# Patient Record
Sex: MALE | Race: WHITE | NOT HISPANIC OR LATINO | Employment: OTHER | URBAN - METROPOLITAN AREA
[De-identification: names, ages, dates, MRNs, and addresses within clinical notes are randomized per-mention and may not be internally consistent; named-entity substitution may affect disease eponyms.]

---

## 2017-08-29 ENCOUNTER — SLEEP CENTER VISIT (OUTPATIENT)
Dept: SLEEP MEDICINE | Facility: MEDICAL CENTER | Age: 57
End: 2017-08-29
Payer: COMMERCIAL

## 2017-08-29 VITALS
BODY MASS INDEX: 36.43 KG/M2 | RESPIRATION RATE: 16 BRPM | SYSTOLIC BLOOD PRESSURE: 122 MMHG | DIASTOLIC BLOOD PRESSURE: 70 MMHG | HEART RATE: 66 BPM | HEIGHT: 69 IN | WEIGHT: 246 LBS | OXYGEN SATURATION: 93 %

## 2017-08-29 DIAGNOSIS — J30.0 VASOMOTOR RHINITIS: ICD-10-CM

## 2017-08-29 DIAGNOSIS — G47.33 OSA ON CPAP: ICD-10-CM

## 2017-08-29 DIAGNOSIS — J34.2 DEVIATED NASAL SEPTUM: ICD-10-CM

## 2017-08-29 PROCEDURE — 99204 OFFICE O/P NEW MOD 45 MIN: CPT | Performed by: FAMILY MEDICINE

## 2017-08-29 RX ORDER — IBUPROFEN 800 MG/1
800 TABLET ORAL
COMMUNITY
Start: 2016-11-17

## 2017-08-29 RX ORDER — GABAPENTIN 400 MG/1
400 CAPSULE ORAL
COMMUNITY
Start: 2017-04-13

## 2017-08-29 RX ORDER — MORPHINE SULFATE 30 MG/1
30 TABLET, FILM COATED, EXTENDED RELEASE ORAL
COMMUNITY
Start: 2017-09-12 | End: 2017-10-12

## 2017-08-29 RX ORDER — ALBUTEROL SULFATE 90 UG/1
AEROSOL, METERED RESPIRATORY (INHALATION)
COMMUNITY
Start: 2017-07-12

## 2017-08-29 RX ORDER — ALFUZOSIN HYDROCHLORIDE 10 MG/1
10 TABLET, EXTENDED RELEASE ORAL
COMMUNITY
Start: 2017-04-13

## 2017-08-29 NOTE — PROGRESS NOTES
"     [facility]  Consult Note     Date: 8/29/2017 / Time: 2:21 PM    Patient ID:   Name:             Nilesh Huynh   YOB: 1960  Age:                 57 y.o.  male   MRN:               [id number]      Thank you for requesting a sleep medicine consultation on Nilesh Huynh at the sleep center. He presents today with the chief complaints of witnessed apnea and  occasional excessive daytime sleepiness (EDS) in spite with the use of CPAP. He is self reffered. He has hx of IRMA and was diagnosed about 10+ years the sleep study was done in [facility] and theresults are not available to be reviewed.. He felt the study wasn't done properly and her never felt any benefit of PAP therapy.     HISTORY OF PRESENT ILLNESS:       At night,  Nilesh Huynh goes to bed around 12-1 am on weekdays and around 12-1 am on weekends. He gets out of bed at 9-10 christina weekdays and at 9-10 am on weekends.  He  averages 6-7 hrs of sleep on a good night and 4 hrs on a bad night. Pt has bad nights 2-3 nights per week. He falls asleep within less than 5 minutes. He awakens  times a night due to 4-6 due to bathroom use.  It takes 1-5 min to fall back asleep.       Mr. Huynh has hx of  of snoring/witnessed apneas/gasping or choking in sleep.  He  denies any symptoms of restless legs syndrome such as an \"urge to move\"  He  legs in the evening or bedtime. He  denies any symptoms of narcolepsy such as sleep paralysis or cataplexy, or any symptoms to suggest parasomnias such as sleep walking or acting out of dreams. He  has not used any medications for her sleep problem.    Overall,  He doesnot finds his sleep refreshing. When He  wakes up in the morning, He  does does feel tired. In terms of  excessive daytime sleepiness,  He complains of sleepiness while  at work, while reading or watching TV and occasionally while driving.   He  Does not take regular naps.    REVIEW OF SYSTEMS:       Constitutional: Denies fevers, " Denies weight changes  Eyes: Denies changes in vision, no eye pain  Ears/Nose/Throat/Mouth: + nasal congestion  And ear fullness  Cardiovascular: Denies chest pain or palpitations   Respiratory: Denies shortness of breath , Denies cough  Gastrointestinal/Hepatic: Denies abdominal pain, nausea, vomiting, diarrhea, constipation or GI bleeding   Genitourinary: Denies bladder dysfunction, dysuria or frequency  Musculoskeletal/Rheum: Denies  joint pain and swelling   Skin/Breast: Denies rash, denies breast lumps or discharge  Neurological: Denies headache, confusion, memory loss or focal weakness/parasthesias  Psychiatric: denies mood disorder     Comprehensive review of systems form is reviewed with the patient and is attached in the EMR.     PMH:  has a past medical history of Back pain; Diabetes; Prostate disorder; and Sleep apnea.  MEDS:   Current Outpatient Prescriptions:   •  ibuprofen (MOTRIN) 800 MG Tab, Take 800 mg by mouth., Disp: , Rfl:   •  [START ON 9/12/2017] morphine ER (MS CONTIN) 30 MG Tab CR tablet, Take 30 mg by mouth., Disp: , Rfl:   •  gabapentin (NEURONTIN) 400 MG Cap, Take 400 mg by mouth., Disp: , Rfl:   •  alfuzosin (UROXATRAL) 10 MG SR tablet, Take 10 mg by mouth., Disp: , Rfl:   •  VENTOLIN  (90 Base) MCG/ACT Aero Soln inhalation aerosol, , Disp: , Rfl:   •  gabapentin (NEURONTIN) 400 MG CAPS, Take 400 mg by mouth 6 Times a Day. Taking 7 tablets per day., Disp: , Rfl:   •  alfuzosin (UROXATRAL) 10 MG SR tablet, Take 1 Tab by mouth every evening. (Patient not taking: Reported on 8/29/2017), Disp: , Rfl:   •  clonazepam (KLONOPIN) 1 MG TABS, Take 1 Tab by mouth 3 times a day. (Patient not taking: Reported on 8/29/2017), Disp: 60 Tab, Rfl:   •  methocarbamol (ROBAXIN) 750 MG TABS, Take 1 Tab by mouth 3 times a day. (Patient not taking: Reported on 8/29/2017), Disp: 120 Tab, Rfl:   •  oxycodone CR (OXYCONTIN) 10 MG TB12, Take 1 Tab by mouth 3 times a day. (Patient not taking: Reported on  "8/29/2017), Disp: 60 Tab, Rfl:   •  Oxycodone-Acetaminophen (PERCOCET-10)  MG TABS, Take 1-2 Tabs by mouth every four hours as needed for Moderate Pain. (Patient not taking: Reported on 8/29/2017), Disp: 15 Tab, Rfl:   •  senna-docusate (PERICOLACE OR SENOKOT S) 8.6-50 MG TABS, Take 2 Tabs by mouth every day. (Patient not taking: Reported on 8/29/2017), Disp: 30 Tab, Rfl:   ALLERGIES: No Known Allergies  SURGHX:   Past Surgical History:   Procedure Laterality Date   • LAMINOTOMY  6/3/2013    Performed by Jamar Sabillon M.D. at SURGERY [facility]   • LUMBAR LAMINECTOMY DISKECTOMY      2001     SOCHX:  reports that he has never smoked. He has never used smokeless tobacco. He reports that he does not use drugs.. Pt use to work as resturant manager..   FH:   Family History   Problem Relation Age of Onset   • No Known Problems Mother    • No Known Problems Father    • Sleep Apnea Neg Hx          Physical Exam:  Vitals/ General Appearance:   Weight/BMI: Body mass index is 36.33 kg/m².  Blood pressure 122/70, pulse 66, resp. rate 16, height 1.753 m (5' 9\"), weight 111.6 kg (246 lb), SpO2 93 %.  Vitals:    08/29/17 1408   BP: 122/70   Pulse: 66   Resp: 16   SpO2: 93%   Weight: 111.6 kg (246 lb)   Height: 1.753 m (5' 9\")       Pt. is alert and oriented to time, place and person. Cooperative and in no apparent distress.       1. Head: Atraumatic, normocephalic. There is no mandibular hypoplasia or maxillary over-jut.   2. Ears: Normal tympanic membrane and no discharge  3. Nose:BLT inferior turbinate hypertophy, left septal deviation  4. Throat: Oropharynx appears crowded  in that the palate is overhanging ([place] scale 4. Tonsils are not enlarged, long uvula , pharynx not inflamed. Tongue is large for the mouth.   5. Neck: Supple. . No thyromegaly  6. Chest: Trachea central, no spine deformity (Scoliosis/Lordosis/Kyphosis)  7. Lungs auscultation: B/L good air entry, vesicular breath sounds, no adventitious " sounds  8. Heart auscultation: 1st and 2nd heart sounds normal, regular rhythm. No appreciable murmur.  9. Abdomen: Soft, non tender, no organomegaly. Bowel sounds present  10. Extremities: No, no deformity, no clubbing, no pedal edema.   Peripheral pulses felt.  11. Skin: No rash  12. NEUROLOGICAL EXAMINATION: On neurological exam, the patient was alert and oriented x3. speech was clear and fluent without dysarthria.  Cranial nerve exam II through XII was normal. Motor exam revealed normal bulk, tone and strength 5/5, which was symmetrical in the upper and lower extremities bilaterally.     INVESTIGATIONS:   Previous sleep studies not available to be reviewed.     ASSESSMENT AND PLAN     1. IRMA on CPAP  POLYSOMNOGRAPHY, 4 OR MORE   2. Vasomotor rhinitis  REFERRAL TO ENT   3. Deviated nasal septum  REFERRAL TO ENT       1. Obstructive Sleep Apnea (IRMA). He  has excessive daytime sleepiness that interferes with activites of daily living. He  risk factors for IRMA include obesity, thick neck, and crowded oropharynx.  He is currently on CPAP for 10+ years however continues to have symptoms of IRMA.     The pathophysiology of IRMA and the increased risk of cardiovascular morbidity from untreated IRMA is discussed in detail with the patient. He  also has HTN, DM which can be worsened by her IRMA.     We have discussed diagnostic options including in-laboratory, attended polysomnography and home sleep testing. We have also discussed treatment options including airway pressurization, reconstructive otolaryngologic surgery, dental appliances and weight management.     He  will be scheduled for an overnight Split night study. He is currently on significant amount of opiate medication which puts him at risk for central sleep apnea therefore I recommend in lab sleep study.      Subsequently,treatment options will be discussed based on the diagnostic study. Meanwhile, He is urged to continues his current CPAP machine , avoid supine  sleep, weight gain and alcoholic beverages since all of these can worsen IRMA. He is cautioned against drowsy driving. If He feels sleepy while driving, He must pull over for a break/nap, rather than persist on the road, in the interest of He own safety and that of others on the road.    2. Vasomotor Rhinitis: Rhinorrhea and nasal congestion all year long as long as he remember. There is no obvious seasonal component to his nasal congestion. Recommended daily saline nasal rinses. I also recommended PRN use of zyrtec/cleritin and Nasacort (mometasone) nasal spray, one or two sniffs in each side of nose daily with worsening of the symptoms. I am referring him to ENT since he has significant nasal deviation which is hindering in PAP therapy.       3. Regarding treatment of other past medical problems and general health maintenance,  He is urged to follow up with PCP.    Return to clinic after Split study.

## 2017-08-29 NOTE — PATIENT INSTRUCTIONS
Sleep Apnea   Sleep apnea is a sleep disorder characterized by abnormal pauses in breathing while you sleep. When your breathing pauses, the level of oxygen in your blood decreases. This causes you to move out of deep sleep and into light sleep. As a result, your quality of sleep is poor, and the system that carries your blood throughout your body (cardiovascular system) experiences stress. If sleep apnea remains untreated, the following conditions can develop:  · High blood pressure (hypertension).  · Coronary artery disease.  · Inability to achieve or maintain an erection (impotence).  · Impairment of your thought process (cognitive dysfunction).  There are three types of sleep apnea:  1. Obstructive sleep apnea--Pauses in breathing during sleep because of a blocked airway.  2. Central sleep apnea--Pauses in breathing during sleep because the area of the brain that controls your breathing does not send the correct signals to the muscles that control breathing.  3. Mixed sleep apnea--A combination of both obstructive and central sleep apnea.  RISK FACTORS  The following risk factors can increase your risk of developing sleep apnea:  · Being overweight.  · Smoking.  · Having narrow passages in your nose and throat.  · Being of older age.  · Being male.  · Alcohol use.  · Sedative and tranquilizer use.  · Ethnicity. Among individuals younger than 35 years,  Americans are at increased risk of sleep apnea.  SYMPTOMS   · Difficulty staying asleep.  · Daytime sleepiness and fatigue.  · Loss of energy.  · Irritability.  · Loud, heavy snoring.  · Morning headaches.  · Trouble concentrating.  · Forgetfulness.  · Decreased interest in sex.  · Unexplained sleepiness.  DIAGNOSIS   In order to diagnose sleep apnea, your caregiver will perform a physical examination. A sleep study done in the comfort of your own home may be appropriate if you are otherwise healthy. Your caregiver may also recommend that you spend the  "night in a sleep lab. In the sleep lab, several monitors record information about your heart, lungs, and brain while you sleep. Your leg and arm movements and blood oxygen level are also recorded.  TREATMENT  The following actions may help to resolve mild sleep apnea:  · Sleeping on your side.    · Using a decongestant if you have nasal congestion.    · Avoiding the use of depressants, including alcohol, sedatives, and narcotics.    · Losing weight and modifying your diet if you are overweight.  There also are devices and treatments to help open your airway:  · Oral appliances. These are custom-made mouthpieces that shift your lower jaw forward and slightly open your bite. This opens your airway.  · Devices that create positive airway pressure. This positive pressure \"splints\" your airway open to help you breathe better during sleep. The following devices create positive airway pressure:  ¨ Continuous positive airway pressure (CPAP) device. The CPAP device creates a continuous level of air pressure with an air pump. The air is delivered to your airway through a mask while you sleep. This continuous pressure keeps your airway open.  ¨ Nasal expiratory positive airway pressure (EPAP) device. The EPAP device creates positive air pressure as you exhale. The device consists of single-use valves, which are inserted into each nostril and held in place by adhesive. The valves create very little resistance when you inhale but create much more resistance when you exhale. That increased resistance creates the positive airway pressure. This positive pressure while you exhale keeps your airway open, making it easier to breath when you inhale again.  ¨ Bilevel positive airway pressure (BPAP) device. The BPAP device is used mainly in patients with central sleep apnea. This device is similar to the CPAP device because it also uses an air pump to deliver continuous air pressure through a mask. However, with the BPAP machine, the " pressure is set at two different levels. The pressure when you exhale is lower than the pressure when you inhale.  · Surgery. Typically, surgery is only done if you cannot comply with less invasive treatments or if the less invasive treatments do not improve your condition. Surgery involves removing excess tissue in your airway to create a wider passage way.     This information is not intended to replace advice given to you by your health care provider. Make sure you discuss any questions you have with your health care provider.     Document Released: 12/08/2003 Document Revised: 01/08/2016 Document Reviewed: 04/25/2013  [employer] Patient Education ©2016 [employer]    1. Nasonex 1-2 puff each nostril twice a day  2. Nasal saline rinses twice a day

## 2017-10-30 ENCOUNTER — TELEPHONE (OUTPATIENT)
Dept: PULMONOLOGY | Facility: HOSPICE | Age: 57
End: 2017-10-30

## 2017-10-31 ENCOUNTER — SLEEP STUDY (OUTPATIENT)
Dept: SLEEP MEDICINE | Facility: MEDICAL CENTER | Age: 57
End: 2017-10-31
Attending: FAMILY MEDICINE
Payer: COMMERCIAL

## 2017-10-31 DIAGNOSIS — G47.33 OSA ON CPAP: ICD-10-CM

## 2017-10-31 PROCEDURE — 95811 POLYSOM 6/>YRS CPAP 4/> PARM: CPT | Performed by: INTERNAL MEDICINE

## 2017-11-01 NOTE — TELEPHONE ENCOUNTER
Pt called to clarify sleep study instructions, answered questions advised to call back with any other questions/ap

## 2017-11-01 NOTE — PROCEDURES
Clinical Comments:    The patient underwent a split night polysomnogram with a CPAP titration using the standard montage for measurement of paramaters of sleep, respiratory events, movement abnormalities, heart rate and rhythm.  A Microphone was used to monitior snoring.    Analysis:  Study start time was 09:36:55 PM.  Total recording time was 3h 18.5m (198 minutes) with a total sleep time of 2h 44.5m (164 minutes) resulting in a sleep efficiency of 82.87%.  Sleep latency from the start fo the study was 03 minutes minutes and REM latency from sleep onset was 257 minutes minutes.    Respiratory:   There were 234 apneas in total consisting of 135 obstructive apneas, 4 mixed apneas, and 95 central apneas.  There were 27 hypopneas in total.  The apnea index was 85.35 per hour and the hypopnea index was 9.85 per hour.  The overall AHI was 95.2, with a REM AHI of 65.00, and a supine AHI of 95.20.    Limb Movements:  There were a total of 104 periodic leg movements, of which 52 were PLMS arousals.  This resulted in a PLMS index of 37.9 and a PLMS arousal index of 19.0    Oximetry:  The mean SaO2 was 83.0% for the diagnostic portion of the study, with a minimum SaO2 of 50.0%.        Treatment:  Interpretation:  Treatment recording time was 4h 48.0m (288 minutes) with a total sleep time of 4h 18.0m (258 minutes) resulting in a sleep efficiency of 89.6%.    Sleep latency from the start of treatment was 04 minutes minutes and REM latency from sleep onset was 1h 34.5m minutes.    The patient had 198 arousals in total for an arousal index of 46.0.    Respiratory:   There were 145 apneas in total consisting of  66 obstructive apneas, 72 central apneas, and 7 mixed apneas for an apnea index of 33.72.    The patient had 75 hypopneas in total, which resulted in a hypopnea index of 17.44.    The overall AHI was 51.16, with a REM AHI of 26.15, and a supine AHI of 51.16.       Limb Movements:  There were a total of 412 periodic leg  movements, of which 82 were PLMS arousals.  This resulted in a PLMS index of 95.8 and a PLMS arousal index of 19.1.    Oximetry:  The mean SaO2 during treatment was 82.0%, with a minimum oxygen saturation of 50.0%.        Interpretation:    This is a split-night study.    In the diagnostic phase there is fragmentation of sleep with increased wake after sleep onset time and an elevated arousal and awakening index.  Some REM sleep time is recorded.  Sleep onset latency is short, suggesting sleep deprivation.  There are frequent periodic limb movements and the periodic limb movement with arousal index is 22.4.  The apnea hypopnea index is 85.3 events per hour, including 95 episodes of central apnea, 35 episodes of obstructive apnea, and 27 episodes of hypopnea.  All the diagnostic study was done in the supine body position.  The lowest arterial oxygen saturation is 50% on room air and he spends about 88% of the diagnostic time with a saturation below 90%.  The heart rate varies from 57-91 bpm.    In the treatment phase of the study there is continued fragmentation of sleep with increased wake after sleep onset time but significant REM sleep time is recorded.  The periodic limb movements persist and are associated with fragmentation of sleep.  CPAP was adjusted across a pressure range of 8-10 cm water pressure with persistence of central and obstructive apnea episodes as well as a few hypopnea episodes as well.  BiPAP was initiated at 13-18/9-14 cm water with persistence of central apnea episodes and occasional hypopnea events and the respiratory events were associated with persistent severe hypoxemia.  Overall there were 72 episodes of central apnea, 6-6 episodes of obstructive apnea, and 75 episodes of hypopnea during the treatment phase.    Assessment:  Very severe sleep apnea hypopnea with an apnea hypopnea index of 95.2 events per hour and a significant number of central apnea episodes.  Severe nocturnal hypoxemia  with a lowest arterial oxygen saturation of 50% on room air.  Fragmentation of sleep related to the breathing events and the periodic limb movements as well.  Central apnea and hypopnea episodes persisted throughout the titration despite the application of CPAP and BiPAP therapy.    Recommendations:  Follow-up polysomnography dedicated to titration of BiPAP at higher levels and the use of ASV or iVAPS as required.  The periodic limb movements may improve with effective treatment for sleep disordered breathing but if they persist and are associated with daytime symptoms, further evaluation and treatment may be needed.

## 2017-11-06 ENCOUNTER — TELEPHONE (OUTPATIENT)
Dept: PULMONOLOGY | Facility: HOSPICE | Age: 57
End: 2017-11-06

## 2017-11-06 NOTE — TELEPHONE ENCOUNTER
Pt was informed to bring the CPAP machine to his visit on 11/7/17 due to him not knowing where to find the chip.

## 2017-11-07 ENCOUNTER — SLEEP CENTER VISIT (OUTPATIENT)
Dept: SLEEP MEDICINE | Facility: MEDICAL CENTER | Age: 57
End: 2017-11-07
Payer: COMMERCIAL

## 2017-11-07 VITALS
HEIGHT: 69 IN | OXYGEN SATURATION: 90 % | WEIGHT: 240 LBS | HEART RATE: 65 BPM | DIASTOLIC BLOOD PRESSURE: 56 MMHG | BODY MASS INDEX: 35.55 KG/M2 | SYSTOLIC BLOOD PRESSURE: 98 MMHG

## 2017-11-07 DIAGNOSIS — G47.33 OSA ON CPAP: ICD-10-CM

## 2017-11-07 DIAGNOSIS — G47.34 NOCTURNAL HYPOXEMIA: ICD-10-CM

## 2017-11-07 DIAGNOSIS — J30.0 VASOMOTOR RHINITIS, UNSPECIFIED CHRONICITY: ICD-10-CM

## 2017-11-07 DIAGNOSIS — G47.33 OSA (OBSTRUCTIVE SLEEP APNEA): ICD-10-CM

## 2017-11-07 PROCEDURE — 99214 OFFICE O/P EST MOD 30 MIN: CPT | Performed by: NURSE PRACTITIONER

## 2017-11-07 RX ORDER — MORPHINE SULFATE 30 MG/1
30 TABLET ORAL EVERY 4 HOURS PRN
COMMUNITY

## 2017-11-07 NOTE — PROGRESS NOTES
Chief Complaint   Patient presents with   • Apnea     Sleep Study Results       HPI:  Nilesh Huynh is a 57 y.o. year old male here today for follow-up on his split night sleep study. He was seen as a new patient with Dr. Mcintyre 8/29/2017. He has a history of sleep apnea diagnosed at an outside sleep facility over 10 years ago. He has been on CPAP therapy. Despite CPAP use he continues to experience non restorative sleep and daytime fatigue. He was ordered a repeat sleep study to qualify him for a new machine and re assess treatment needs. He is currently on a CPAP of 10 CM H20. His compliance download today in the office indicates an AHI of 66 with an average use of 3 hours at night.   PSG dated 10/31/2017 indicates an AHI of 95.2 with a low 02 of 50%. He had 36.4% centrals. He had an incomplete titration to CPAP and Bilevel pressures tried. On his final Bipap pressure of 18/14 CM H20 his AHI was reduced to 25 with a mean 02 saturation of only 85%.   He has a history of rhinitis. He was recommended an OTC antihistamine, saline irrigation and nasal steroid at his last office visit. He was also referred to ENT to evaluate for nasal septal deviation effecting his CPAP therapy. He has seen ENT in consult.   He denies significant dyspnea during the day. He has had 40 pound weight gain. He admits to not exercising routinely. He denies current cough, mucous or wheeze.     Past Medical History:   Diagnosis Date   • Back pain    • Diabetes    • Prostate disorder    • Sleep apnea        Past Surgical History:   Procedure Laterality Date   • LAMINOTOMY  6/3/2013    Performed by Jamar Sabillon M.D. at SURGERY [facility]   • LUMBAR LAMINECTOMY DISKECTOMY      2001       Family History   Problem Relation Age of Onset   • No Known Problems Mother    • No Known Problems Father    • Sleep Apnea Neg Hx        Social History     Social History   • Marital status: Single     Spouse name: N/A   • Number of children: N/A   •  Years of education: N/A     Occupational History   • Not on file.     Social History Main Topics   • Smoking status: Never Smoker   • Smokeless tobacco: Never Used   • Alcohol use Not on file   • Drug use: No   • Sexual activity: Not on file     Other Topics Concern   • Not on file     Social History Narrative   • No narrative on file         ROS:  Constitutional: Denies fevers, chills, sweats, weight loss  Eyes: Denies vision loss, pain, drainage, double vision. Wears glasses   Ears/Nose/Mouth/Throat: Denies ear ache, difficulty hearing, sore throat, persistent hoarseness, decayed teeth/toothache  Cardiovascular: Denies chest pain, tightness, palpitations, swelling in feet/legs, fainting, difficulty breathing when laying down  Respiratory: Denies shortness of breath, cough, sputum, wheezing, painful breathing, coughing up blood  GI: Denies heartburn, difficulty swallowing, nausea, vomiting, abdominal pain, diarrhea, constipation  : Denies frequent urination, painful urination  Integumentary: Denies rashes, lumps or color changes  MSK: Positive back pain.   Neurological: Denies frequent headaches, dizziness, weakness  Sleep: See HPI       Current Outpatient Prescriptions   Medication Sig Dispense Refill   • morphine (MS IR) 30 MG tablet Take 30 mg by mouth every four hours as needed for Severe Pain.     • ibuprofen (MOTRIN) 800 MG Tab Take 800 mg by mouth.     • gabapentin (NEURONTIN) 400 MG Cap Take 400 mg by mouth.     • alfuzosin (UROXATRAL) 10 MG SR tablet Take 10 mg by mouth.     • VENTOLIN  (90 Base) MCG/ACT Aero Soln inhalation aerosol      • Oxycodone-Acetaminophen (PERCOCET-10)  MG TABS Take 1-2 Tabs by mouth every four hours as needed for Moderate Pain. 15 Tab    • alfuzosin (UROXATRAL) 10 MG SR tablet Take 1 Tab by mouth every evening. (Patient not taking: Reported on 8/29/2017)     • clonazepam (KLONOPIN) 1 MG TABS Take 1 Tab by mouth 3 times a day. (Patient not taking: Reported on  "8/29/2017) 60 Tab    • methocarbamol (ROBAXIN) 750 MG TABS Take 1 Tab by mouth 3 times a day. (Patient not taking: Reported on 8/29/2017) 120 Tab    • oxycodone CR (OXYCONTIN) 10 MG TB12 Take 1 Tab by mouth 3 times a day. (Patient not taking: Reported on 8/29/2017) 60 Tab    • senna-docusate (PERICOLACE OR SENOKOT S) 8.6-50 MG TABS Take 2 Tabs by mouth every day. (Patient not taking: Reported on 8/29/2017) 30 Tab    • gabapentin (NEURONTIN) 400 MG CAPS Take 400 mg by mouth 6 Times a Day. Taking 7 tablets per day.       No current facility-administered medications for this visit.        No Known Allergies    Blood pressure (!) 98/56, pulse 65, height 1.753 m (5' 9\"), weight 108.9 kg (240 lb), SpO2 90 %.    PE:   Appearance: Well developed, well nourished, no acute distress  Eyes: PERRL, EOM intact, sclera white, conjunctiva moist  Ears: no lesions or deformities  Hearing: grossly intact  Nose: no lesions or deformities  Oropharynx: tongue normal, posterior pharynx without erythema or exudate  Mallampati Classification: class 3  Neck: supple, trachea midline, no masses   Respiratory effort: no intercostal retractions or use of accessory muscles  Lung auscultation: no rales, rhonchi or wheezes  Heart auscultation: no murmur rub or gallop  Extremities: no cyanosis or edema  Abdomen: soft ,non tender, no masses  Gait and Station: normal  Digits and nails: no clubbing, cyanosis, petechiae or nodes.  Cranial nerves: grossly intact  Skin: no rashes, lesions or ulcers noted  Orientation: Oriented to time, person and place  Mood and affect: mood and affect appropriate, normal interaction with examiner  Judgement: Intact          Assessment:  1. IRMA on CPAP     2. Nocturnal hypoxemia  POLYSOMNOGRAPHY TITRATION    ECHOCARDIOGRAM COMP W/O CONT   3. BMI 35.0-35.9,adult     4. Vasomotor rhinitis, unspecified chronicity     5. IRMA (obstructive sleep apnea)  POLYSOMNOGRAPHY TITRATION         Plan:    1) Reviewed his split night " sleep study in detail with Dr. Mcintyre. He has severe sleep apnea with significant hypoxemia. He did have 36.4% centrals. He had an incomplete titration to CPAP and Bilevel pressures tried. Dr. Mcintyre recommends an in lab dedicated Bipap titration study now with possible switch to ASV if qualifies. We will arrange for dedicated titration study with prompt follow up in the office after for results. He will remain on CPAP in the interim.   2) Sleep hygiene discussed.  3) Continue saline sinus rinse, nasal steroid and OTC antihistamine. Request consult records from ENT.   4) Echocardiogram now for further work up his significant nocturnal hypoxemia.  5) Follow up after Echo and titration study with Dr. Mcintyre or myself.

## 2017-11-07 NOTE — PATIENT INSTRUCTIONS
Plan:    1) Reviewed his split night sleep study in detail with Dr. Mcintyre. He has severe sleep apnea with significant hypoxemia. He did have 36.4% centrals. He had an incomplete titration to CPAP and Bilevel pressures tried. Dr. Mcintyre recommends an in lab dedicated Bipap titration study now with possible switch to ASV if qualifies. We will arrange for dedicated titration study with prompt follow up in the office after for results. He will remain on CPAP in the interim.   2) Sleep hygiene discussed.  3) Continue saline sinus rinse, nasal steroid and OTC antihistamine. Request consult records from ENT.   4) Echocardiogram now for further work up his significant nocturnal hypoxemia.  5) Follow up after Echo and titration study with Dr. Mcintyre or myself.

## 2017-11-21 PROCEDURE — 93306 TTE W/DOPPLER COMPLETE: CPT | Mod: 26 | Performed by: INTERNAL MEDICINE

## 2017-11-21 PROCEDURE — 93306 TTE W/DOPPLER COMPLETE: CPT

## 2017-11-22 ENCOUNTER — HOSPITAL ENCOUNTER (OUTPATIENT)
Dept: CARDIOLOGY | Facility: MEDICAL CENTER | Age: 57
End: 2017-11-22
Attending: NURSE PRACTITIONER
Payer: COMMERCIAL

## 2017-11-22 DIAGNOSIS — G47.34 NOCTURNAL HYPOXEMIA: ICD-10-CM

## 2017-11-22 LAB
LV EJECT FRACT  99904: 65
LV EJECT FRACT MOD 2C 99903: 63.59
LV EJECT FRACT MOD 4C 99902: 70.11
LV EJECT FRACT MOD BP 99901: 67.69

## 2018-01-13 ENCOUNTER — SLEEP STUDY (OUTPATIENT)
Dept: SLEEP MEDICINE | Facility: MEDICAL CENTER | Age: 58
End: 2018-01-13
Attending: NURSE PRACTITIONER
Payer: COMMERCIAL

## 2018-01-13 DIAGNOSIS — G47.34 NOCTURNAL HYPOXEMIA: ICD-10-CM

## 2018-01-13 DIAGNOSIS — G47.33 OSA (OBSTRUCTIVE SLEEP APNEA): ICD-10-CM

## 2018-01-13 PROCEDURE — 95811 POLYSOM 6/>YRS CPAP 4/> PARM: CPT | Performed by: INTERNAL MEDICINE

## 2018-01-15 NOTE — PROCEDURES
Clinical Comments:  The patient underwent a comprehensive polysomnogram using the standard montage for measurement of parameters of sleep, respiratory events, movement abnormalities, heart rate and rhythm. A microphone was used to monitor snoring.      ANALYSIS:  The total recording time was 458.7 minutes with a sleep period of 457.5 minutes and the total sleep time was 353.5 minutes with a sleep efficiency of 77.1%.  The sleep latency was 1.1 minutes, and REM latency was 202.5 minutes.  The patient experienced 134 arousals in total, for an arousal index of 22.7    RESPIRATORY: The patient had 137 apneas in total.  Of these, 6 were obstructive apneas, and 131 were central apneas.  This resulted in an apnea index (AI) of 23.3.  The patient had 259 hypopneas, for a hypopnea index of 44.0.  The overall AHI was 67.2, while the AHI during Stage R sleep was 48.8.  AHI while supine was 67.2.    OXIMETRY: Oxygen saturation monitoring showed a mean SpO2 of 86.2%, with a minimum oxygen saturation of 58.0%.  Oxygen saturations were less than or = 89% for 221.0 minutes of sleep time.    CARDIAC: The highest heart rate during the recording was 129.0 beats per minute.  The average heart rate during sleep was 74.6 bpm.    LIMB MOVEMENTS: There were a total of 0 PLMs during sleep, of which 0 were PLMs arousals.  This resulted in a PLMS index of 0.0.      Interpretation:    Mr. Huynh presented with excessive daytime somnolence. Split night polysomnography on August 31, 2017 demonstrated an apnea hypopnea index of 85.2 events per hour with the lowest arterial oxygen saturation of 50% on room air. 31% of the sleep breathing events were central apneas. There was no effective response to CPAP therapy and only a partial response to BiPAP therapy. He has been treated with BiPAP but he remains sleepy during the day with nocturnal hypoxemia. This study was designed to establish effective therapy.    There is fragmentation of sleep  with increased wake after sleep onset time totaling more than 1.5 hours and an elevated arousal and awakening index. No periodic limb movements are identified. 118 minutes of REM sleep time was included. Sleep onset latency is short at 1.1 minutes, suggesting sleep deprivation.    BiPAP therapy was applied with a pressure range of 16-19/12-15 cm water. In the final pressure stage, the hypopnea episodes were suppressed but central apnea episodes persisted in large numbers. Through the titration there were 131 episodes of central apnea, 6 obstructive apneas and 259 episodes of hypopnea. Servo adaptive BiPAP ventilation was then applied with an expiratory pressure range of 4-15 cm water with pressure support at zero to 20 cm water. Both preset and automatic respiratory rates were tried. The ASV seemed effective in reducing the number of central apnea episodes but hypopnea episodes persisted through the ASV titration. In the final pressure stage, the apnea hypopnea index was still 51.1 events per hour with a mean arterial oxygen saturation of 82% and a minimum saturation of 62% on room air. That stage in the titration included 53 minutes of REM sleep time, 52 minutes of non-REM sleep time, and was done in the supine body position.    Assessment:  This study demonstrates severe sleep apnea hypopnea with frequent central apnea and hypopnea events suggesting complex sleep apnea. The hypopnea episodes were largely suppressed at an expiratory pressure of 15 cm water but frequent central apnea episodes persisted on BiPAP. The ASV titration was characterized by suppression of the central apnea events but return of the hypopnea events even with expiratory pressures as high as 15 cm water. Sleep was better consolidated on the ASV treatment but severe hypoxemia persisted. This was an unsuccessful titration.    Recommendations:  A specific treatment plan is difficult to extrapolate from this study. Unfortunately the best  alternative would probably be follow-up polysomnography with further titration of ASV therapy. Higher minimum expiratory pressures might suppress the persisting hypopnea events and I would recommend the use of the Respironics ASV protocol which has the capacity to adjust both inspiratory and expiratory pressures. He did best with a large air fit F 20 full face mask.

## 2018-01-25 ENCOUNTER — TELEPHONE (OUTPATIENT)
Dept: PULMONOLOGY | Facility: HOSPICE | Age: 58
End: 2018-01-25

## 2018-01-30 ENCOUNTER — SLEEP CENTER VISIT (OUTPATIENT)
Dept: SLEEP MEDICINE | Facility: MEDICAL CENTER | Age: 58
End: 2018-01-30
Payer: COMMERCIAL

## 2018-01-30 VITALS
OXYGEN SATURATION: 92 % | RESPIRATION RATE: 16 BRPM | HEIGHT: 69 IN | WEIGHT: 258.3 LBS | BODY MASS INDEX: 38.26 KG/M2 | HEART RATE: 81 BPM | DIASTOLIC BLOOD PRESSURE: 62 MMHG | SYSTOLIC BLOOD PRESSURE: 108 MMHG

## 2018-01-30 DIAGNOSIS — G47.33 OSA (OBSTRUCTIVE SLEEP APNEA): ICD-10-CM

## 2018-01-30 DIAGNOSIS — G47.34 NOCTURNAL HYPOXEMIA: ICD-10-CM

## 2018-01-30 DIAGNOSIS — G47.31 CENTRAL SLEEP APNEA: ICD-10-CM

## 2018-01-30 DIAGNOSIS — I27.20 PULMONARY HYPERTENSION (HCC): ICD-10-CM

## 2018-01-30 DIAGNOSIS — J30.0 VASOMOTOR RHINITIS, UNSPECIFIED CHRONICITY: ICD-10-CM

## 2018-01-30 PROCEDURE — 99213 OFFICE O/P EST LOW 20 MIN: CPT | Performed by: NURSE PRACTITIONER

## 2018-01-30 NOTE — PROGRESS NOTES
Chief Complaint   Patient presents with   • Apnea     Sleep Study Results       HPI:  Nilesh Hyunh is a 57 y.o. year old male here today for follow-up on his complex sleep apnea and recent titration study. He was seen as a new patient with Dr. Mcintyre 8/29/2017. He has a history of sleep apnea diagnosed at an outside sleep facility over 10 years ago. He has been on CPAP therapy. Despite CPAP use he continues to experience non restorative sleep and daytime fatigue. He was ordered a repeat sleep study to qualify him for a new machine and re assess treatment needs. He is currently on a CPAP of 10 CM H20. Compliance download at his follow up visit indicated an AHI of 66 with an average use of 3 hours at night.   PSG dated 10/31/2017 indicates an AHI of 95.2 with a low 02 of 50%. He had 36.4% centrals. He had an incomplete titration to CPAP and Bilevel pressures tried. On his final Bipap pressure of 18/14 CM H20 his AHI was reduced to 25 with a mean 02 saturation of only 85%. He underwent a dedicated Bipap titration study 1/13/2017 which indicated an incomplete titration to all Bipap pressures tried. He was switched to ASV with an ongoing elevated AHI. He had 33.1% centrals during his titration. He states he was unable to sleep the night of the study and does not feel it was a good study. He states the night of the study the room was too cold and he was unable to sleep.     He has a history of rhinitis. He was recommended an OTC antihistamine, saline irrigation and nasal steroid at his last office visit. He was also referred to ENT to evaluate for nasal septal deviation effecting his CPAP therapy. He has seen ENT in consult.   He denies significant dyspnea during the day. He has had 40 pound weight gain. He admits to not exercising routinely. He denies current cough, mucous or wheeze.     Echo was obtained given his significant 02 desaturations 11/21/2017 and indicates;  CONCLUSIONS  No prior study is available for  comparison.   Technically difficult study - adequate information is obtained.   Left ventricular ejection fraction is visually estimated to be 65%.  No significant valve disease or flow abnormalities.   Right ventricle not well visualized.  Estimated right ventricular   systolic pressure is at least 35 mmHg.      Past Medical History:   Diagnosis Date   • Back pain    • Diabetes    • Prostate disorder    • Sleep apnea        Past Surgical History:   Procedure Laterality Date   • LAMINOTOMY  6/3/2013    Performed by Jamar Sabillon M.D. at SURGERY [facility]   • LUMBAR LAMINECTOMY DISKECTOMY      2001       Family History   Problem Relation Age of Onset   • No Known Problems Mother    • No Known Problems Father    • Sleep Apnea Neg Hx        Social History     Social History   • Marital status: Single     Spouse name: N/A   • Number of children: N/A   • Years of education: N/A     Occupational History   • Not on file.     Social History Main Topics   • Smoking status: Never Smoker   • Smokeless tobacco: Never Used   • Alcohol use 0.6 - 1.2 oz/week     1 - 2 Glasses of wine per week   • Drug use: No   • Sexual activity: Not on file     Other Topics Concern   • Not on file     Social History Narrative   • No narrative on file         ROS:  Constitutional: Denies fevers, chills, sweats, weight loss  Eyes: Denies vision loss, pain, drainage, double vision. Wears glasses   Ears/Nose/Mouth/Throat: Denies ear ache, difficulty hearing, sore throat, persistent hoarseness, decayed teeth/toothache  Cardiovascular: Denies chest pain, tightness, palpitations, swelling in feet/legs, fainting, difficulty breathing when laying down  Respiratory: See HPI   GI: Denies heartburn, difficulty swallowing, nausea, vomiting, abdominal pain, diarrhea, constipation  : Denies frequent urination, painful urination  Integumentary: Denies rashes, lumps or color changes  MSK: Denies painful joints, sore muscles, and back pain.  "  Neurological: Denies frequent headaches, dizziness, weakness  Sleep: See HPI     Current Outpatient Prescriptions   Medication Sig Dispense Refill   • morphine (MS IR) 30 MG tablet Take 30 mg by mouth every four hours as needed for Severe Pain.     • ibuprofen (MOTRIN) 800 MG Tab Take 800 mg by mouth.     • VENTOLIN  (90 Base) MCG/ACT Aero Soln inhalation aerosol      • Oxycodone-Acetaminophen (PERCOCET-10)  MG TABS Take 1-2 Tabs by mouth every four hours as needed for Moderate Pain. 15 Tab    • gabapentin (NEURONTIN) 400 MG CAPS Take 400 mg by mouth 6 Times a Day. Taking 7 tablets per day.     • gabapentin (NEURONTIN) 400 MG Cap Take 400 mg by mouth.     • alfuzosin (UROXATRAL) 10 MG SR tablet Take 10 mg by mouth.     • alfuzosin (UROXATRAL) 10 MG SR tablet Take 1 Tab by mouth every evening. (Patient not taking: Reported on 8/29/2017)     • clonazepam (KLONOPIN) 1 MG TABS Take 1 Tab by mouth 3 times a day. (Patient not taking: Reported on 8/29/2017) 60 Tab    • methocarbamol (ROBAXIN) 750 MG TABS Take 1 Tab by mouth 3 times a day. (Patient not taking: Reported on 8/29/2017) 120 Tab    • oxycodone CR (OXYCONTIN) 10 MG TB12 Take 1 Tab by mouth 3 times a day. (Patient not taking: Reported on 8/29/2017) 60 Tab    • senna-docusate (PERICOLACE OR SENOKOT S) 8.6-50 MG TABS Take 2 Tabs by mouth every day. (Patient not taking: Reported on 8/29/2017) 30 Tab      No current facility-administered medications for this visit.        No Known Allergies    Blood pressure 108/62, pulse 81, resp. rate 16, height 1.753 m (5' 9\"), weight 117.2 kg (258 lb 4.8 oz), SpO2 92 %.    PE:   Appearance: Obese, no acute distress  Eyes: PERRL, EOM intact, sclera white, conjunctiva moist  Ears: no lesions or deformities  Hearing: grossly intact  Nose: no lesions or deformities  Oropharynx: tongue normal, posterior pharynx without erythema or exudate  Mallampati Classification: Class 3  Neck: supple, trachea midline, no masses "   Respiratory effort: no intercostal retractions or use of accessory muscles  Lung auscultation: no rales, rhonchi or wheezes  Heart auscultation: no murmur rub or gallop  Extremities: no cyanosis or edema  Abdomen: soft ,non tender, no masses  Gait and Station: normal  Digits and nails: no clubbing, cyanosis, petechiae or nodes.  Cranial nerves: grossly intact  Skin: no rashes, lesions or ulcers noted  Orientation: Oriented to time, person and place  Mood and affect: mood and affect appropriate, normal interaction with examiner  Judgement: Intact          Assessment:  1. IRMA (obstructive sleep apnea)  POLYSOMNOGRAPHY TITRATION   2. Central sleep apnea  POLYSOMNOGRAPHY TITRATION   3. Nocturnal hypoxemia     4. Vasomotor rhinitis, unspecified chronicity     5. BMI 38.0-38.9,adult     6. Pulmonary hypertension           Plan:    1) Reviewed titration study in detail. He had an incomplete titration to Bipap and ASV. He only had 33% centrals during the titration. He did not feel he slept well the night of the study. Dr. Medley recommends a repeat titration study. If he qualifies for servo he recommends Auto SV.   2) Sleep hygiene discussed.  3) Continue saline sinus rinse, nasal steroid and OTC antihistamine. Request consult records from ENT.   4) Echocardiogram indicates Pulmonary Hypertension.   5) Follow up after titration study with Dr. Mcintyre or myself.

## 2018-01-30 NOTE — PATIENT INSTRUCTIONS
Plan:    1) Reviewed titration study in detail. He had an incomplete titration to Bipap and ASV. He only had 33% centrals during the titration. He did not feel he slept well the night of the study. Dr. Medley recommends a repeat titration study. If he qualifies for servo he recommends Auto SV.   2) Sleep hygiene discussed.  3) Continue saline sinus rinse, nasal steroid and OTC antihistamine. Request consult records from ENT.   4) Echocardiogram indicates Pulmonary Hypertension.   5) Follow up after titration study with Dr. Mcintyre or myself.

## 2018-02-07 ENCOUNTER — SLEEP STUDY (OUTPATIENT)
Dept: SLEEP MEDICINE | Facility: MEDICAL CENTER | Age: 58
End: 2018-02-07
Attending: NURSE PRACTITIONER
Payer: COMMERCIAL

## 2018-02-07 DIAGNOSIS — G47.33 OSA (OBSTRUCTIVE SLEEP APNEA): ICD-10-CM

## 2018-02-07 DIAGNOSIS — G47.31 CENTRAL SLEEP APNEA: ICD-10-CM

## 2018-02-07 PROCEDURE — 95811 POLYSOM 6/>YRS CPAP 4/> PARM: CPT | Performed by: FAMILY MEDICINE

## 2018-02-08 NOTE — PROCEDURES
Clinical Comments:  The patient underwent a BIPAP titration using the standard montage for measurement of parameters of sleep, respiratory events, movement abnormalities, heart rate and rhythm. A microphone was used to monitor snoring.        INTERPRETATION:  Testing began at 8:27:21 PM.  The total recording time was 533.6 minutes with a sleep period of 530.3 minutes and the total sleep time was 439.0 minutes with a sleep efficiency of 82.3%.  The sleep latency was 3.2 minutes, and REM latency was 52.0 minutes.  The patient experienced 99 arousals in total, for an arousal index of 13.5     RESPIRATORY: The patient had 132 apneas in total.  Of these, 23 were obstructive apneas, and 109 were central apneas.  This resulted in an apnea index (AI) of 18.0.  The patient had 177 hypopneas, for a hypopnea index of 24.2.  The overall AHI was 42.2, while the AHI during Stage R sleep was 34.6.  AHI while supine was 42.2.     OXIMETRY: Oxygen saturation monitoring showed a mean SpO2 of 90.2%, with a minimum oxygen saturation of 0.0%.  Oxygen saturations were less than or = 89% for 98.4 minutes of sleep time.     CARDIAC: The highest heart rate during the recording was 82.0 beats per minute.  The average heart rate during sleep was 60.0 bpm.     LIMB MOVEMENTS: There were a total of 31 PLMs during sleep, of which 1 were PLMs arousals.  This resulted in a PLMS index of 4.2.     BIPAP was tried from 18/14cm H2O and titrated to 25/21cm H2O.  A backup rate was also tried at 12 and 13bpm.     Technical summary: The patient underwent a CPAP titration.  This was a 16 channel montage study to include a 6 channel EEG, a 2 channel EOG, and chin EMG, left and right leg EMG, a snore channel, and a CFLOW pressure transducer.   Respiratory effort was assessed with the use of a thoracic and abdominal monitor and overnight oximetry was obtained. Audio and video recordings were reviewed. This was a fully attended study and sleep stage scoring  was performed. The test was technically adequate.    General sleep summary:  During the overnight study, the sleep latency was 3.2 min which is decreased. The REM latency was 50 which is decreased. The total sleep time was 439 min and sleep efficiency was 82 which is decreased.  Sleep stage proportions showed no N3 sleep and increased WASO of 91 min.  In regards to sleep quality there was a mild degree of sleep fragmentation as shown by the arousal index of 13 an hour which is increased. The arousals were due to respiratory events.    CPAP Titration:  The PAP titration was initiated with  BiPAP 18/14 cm of water and the pressure which was slowly titrated up in an attempt to eliminate sleep disordered breathing and snoring. The final pressure tested during the study was BiPAP 25/21 cm water and at this final pressure the patient was observed in the supine position but not REM sleep stage. Due to central apneas, BiPAP ST mode was tried with back up rate of 12 and 13. The apnea hypopnea index was 28 per hour and O2 carmen 87%. The average O2 stauration was 92%. Snoring was resolved. There were no significant periodic limb movements.  The patient demonstrated NSR and an average heart rate of 59 beats per minute.  There was no ventricular ectopy or tachyarrhythmias. The patient utilized large simplus mask with heated humidification. The CPAP was well-tolerated and there were minimal air leaks. No supplemental oxygen was required.    Impression:  1.  Complex sleep apnea   2.  Sub optimal titration  3. Sleep hypoxia    Recommendations:  There is no definitive pressure that can be extrapolated from this study. Consider ASV titration in light of complex sleep apnea. The central sleep apnea could be due to prescribed opoid use. However BiPAP ST 25/21 cm with back up rate 12 BPM can be tried. Consider supplemental oxygen bleed in if sleep hypoxia persist on overnight pulse ox on the recommended pressure.  Recommended a data  card that can measure leak, apnea hypopnea index and compliance for further outpatient monitoring and management of CPAP therapy. In some cases alternative treatment options may prove effective in resolving sleep apnea and these options include upper airway surgery, the use of a dental orthotic or weight loss and positional therapy. Clinical correlation is required. In general patients with sleep apnea are advised to avoid alcohol and sedatives and to not operate a motor vehicle while drowsy and are at a greater risk for cardiovascular disease.

## 2018-02-20 ENCOUNTER — SLEEP CENTER VISIT (OUTPATIENT)
Dept: SLEEP MEDICINE | Facility: MEDICAL CENTER | Age: 58
End: 2018-02-20
Payer: COMMERCIAL

## 2018-02-20 VITALS
HEART RATE: 63 BPM | BODY MASS INDEX: 38.65 KG/M2 | RESPIRATION RATE: 16 BRPM | OXYGEN SATURATION: 93 % | DIASTOLIC BLOOD PRESSURE: 76 MMHG | HEIGHT: 70 IN | WEIGHT: 270 LBS | SYSTOLIC BLOOD PRESSURE: 128 MMHG

## 2018-02-20 DIAGNOSIS — G47.34 NOCTURNAL HYPOXEMIA: ICD-10-CM

## 2018-02-20 DIAGNOSIS — G47.31 CENTRAL SLEEP APNEA: ICD-10-CM

## 2018-02-20 DIAGNOSIS — J30.0 VASOMOTOR RHINITIS, UNSPECIFIED CHRONICITY: ICD-10-CM

## 2018-02-20 DIAGNOSIS — I27.20 PULMONARY HYPERTENSION (HCC): ICD-10-CM

## 2018-02-20 DIAGNOSIS — G47.33 OSA (OBSTRUCTIVE SLEEP APNEA): ICD-10-CM

## 2018-02-20 PROCEDURE — 99214 OFFICE O/P EST MOD 30 MIN: CPT | Performed by: NURSE PRACTITIONER

## 2018-02-20 NOTE — PROGRESS NOTES
Chief Complaint   Patient presents with   • Apnea     titration study results        HPI:  Nilesh Huynh is a 57 y.o. year old male here today for follow-up on his complex sleep apnea and recent titration study. He was seen as a new patient with Dr. Mcintyre 8/29/2017. He has a history of sleep apnea diagnosed at an outside sleep facility over 10 years ago. He has been on CPAP therapy. Despite CPAP use he continues to experience non restorative sleep and daytime fatigue. He was ordered a repeat sleep study to qualify him for a new machine and re assess treatment needs. He is currently on a CPAP of 10 CM H20. Compliance download at his follow up visit indicated an AHI of 66 with an average use of 3 hours at night.     PSG dated 10/31/2017 indicates an AHI of 95.2 with a low 02 of 50%. He had 36.4% centrals. He had an incomplete titration to CPAP and Bilevel pressures tried. On his final Bipap pressure of 18/14 CM H20 his AHI was reduced to 25 with a mean 02 saturation of only 85%. He underwent a dedicated Bipap titration study 1/13/2017 which indicated an incomplete titration to all Bipap pressures tried. He was switched to ASV with an ongoing elevated AHI. He only had 33.1% centrals during his titration. He states he was unable to sleep the night of the study and does not feel it was a good study. He states the night of the study the room was too cold and he was unable to sleep.   He had a repeat dedicated titration study 2/7/2018 which an incomplete titration to all Bilevel pressures tried. He only had 35.3% centrals during this titration and did not qualify for ASV. He did best on a Bilevel pressure of 25/20 CM H20 in which his AHI was reduced to 14.3. He had 32 minutes or REM sleep on this setting.     He has a history of rhinitis. He was recommended an OTC antihistamine, saline irrigation and nasal steroid at his last office visit. He was also referred to ENT to evaluate for nasal septal deviation effecting  his CPAP therapy. He has seen ENT in consult.     He denies significant dyspnea during the day. He has had 40 pound weight gain. He admits to not exercising routinely. He states back pain often limits his exercise. He denies current cough, mucous or wheeze.      Echo was obtained given his significant 02 desaturations 11/21/2017 and indicates;  CONCLUSIONS  No prior study is available for comparison.   Technically difficult study - adequate information is obtained.   Left ventricular ejection fraction is visually estimated to be 65%.  No significant valve disease or flow abnormalities.   Right ventricle not well visualized.  Estimated right ventricular   systolic pressure is at least 35 mmHg.         Past Medical History:   Diagnosis Date   • Back pain    • Diabetes    • Prostate disorder    • Sleep apnea        Past Surgical History:   Procedure Laterality Date   • LAMINOTOMY  6/3/2013    Performed by Jamar Sabillon M.D. at SURGERY [facility]   • LUMBAR LAMINECTOMY DISKECTOMY      2001       Family History   Problem Relation Age of Onset   • No Known Problems Mother    • No Known Problems Father    • Sleep Apnea Neg Hx        Social History     Social History   • Marital status: Single     Spouse name: N/A   • Number of children: N/A   • Years of education: N/A     Occupational History   • Not on file.     Social History Main Topics   • Smoking status: Never Smoker   • Smokeless tobacco: Never Used   • Alcohol use 0.6 - 1.2 oz/week     1 - 2 Glasses of wine per week   • Drug use: No   • Sexual activity: Not on file     Other Topics Concern   • Not on file     Social History Narrative   • No narrative on file       ROS:  Constitutional: Denies fevers, chills, sweats, weight loss  Eyes: Denies vision loss, pain, drainage, double vision. Wears glasses   Ears/Nose/Mouth/Throat: Denies ear ache, difficulty hearing, sore throat, persistent hoarseness, decayed teeth/toothache  Cardiovascular: Denies chest pain,  tightness, palpitations, swelling in feet/legs, fainting, difficulty breathing when laying down  Respiratory: Denies shortness of breath, cough, sputum, wheezing, painful breathing, coughing up blood  GI: Denies heartburn, difficulty swallowing, nausea, vomiting, abdominal pain, diarrhea, constipation  : Denies frequent urination, painful urination  Integumentary: Denies rashes, lumps or color changes  MSK: Positive for back pain   Neurological: Denies frequent headaches, dizziness, weakness  Sleep: See HPI       Current Outpatient Prescriptions   Medication Sig Dispense Refill   • morphine (MS IR) 30 MG tablet Take 30 mg by mouth every four hours as needed for Severe Pain.     • ibuprofen (MOTRIN) 800 MG Tab Take 800 mg by mouth.     • gabapentin (NEURONTIN) 400 MG Cap Take 400 mg by mouth.     • alfuzosin (UROXATRAL) 10 MG SR tablet Take 10 mg by mouth.     • VENTOLIN  (90 Base) MCG/ACT Aero Soln inhalation aerosol      • alfuzosin (UROXATRAL) 10 MG SR tablet Take 1 Tab by mouth every evening. (Patient not taking: Reported on 8/29/2017)     • clonazepam (KLONOPIN) 1 MG TABS Take 1 Tab by mouth 3 times a day. (Patient not taking: Reported on 8/29/2017) 60 Tab    • methocarbamol (ROBAXIN) 750 MG TABS Take 1 Tab by mouth 3 times a day. (Patient not taking: Reported on 8/29/2017) 120 Tab    • oxycodone CR (OXYCONTIN) 10 MG TB12 Take 1 Tab by mouth 3 times a day. (Patient not taking: Reported on 8/29/2017) 60 Tab    • Oxycodone-Acetaminophen (PERCOCET-10)  MG TABS Take 1-2 Tabs by mouth every four hours as needed for Moderate Pain. 15 Tab    • senna-docusate (PERICOLACE OR SENOKOT S) 8.6-50 MG TABS Take 2 Tabs by mouth every day. (Patient not taking: Reported on 8/29/2017) 30 Tab    • gabapentin (NEURONTIN) 400 MG CAPS Take 400 mg by mouth 6 Times a Day. Taking 7 tablets per day.       No current facility-administered medications for this visit.        No Known Allergies    Blood pressure 128/76, pulse  "63, resp. rate 16, height 1.778 m (5' 10\"), weight 122.5 kg (270 lb), SpO2 93 %.    PE:   Appearance: Well developed, well nourished, no acute distress  Eyes: PERRL, EOM intact, sclera white, conjunctiva moist  Ears: no lesions or deformities  Hearing: grossly intact  Nose: no lesions or deformities  Oropharynx: tongue normal, posterior pharynx without erythema or exudate  Mallampati Classification: Class 3  Neck: supple, trachea midline, no masses   Respiratory effort: no intercostal retractions or use of accessory muscles  Lung auscultation: no rales, rhonchi or wheezes  Heart auscultation: no murmur rub or gallop  Extremities: no cyanosis. Bilateral non pitting lower extremity edema   Abdomen: soft ,non tender, no masses  Gait and Station: normal  Digits and nails: no clubbing, cyanosis, petechiae or nodes.  Cranial nerves: grossly intact  Skin: no rashes, lesions or ulcers noted  Orientation: Oriented to time, person and place  Mood and affect: mood and affect appropriate, normal interaction with examiner  Judgement: Intact          Assessment:  1. IRMA (obstructive sleep apnea)  DME BIPAP    OVERNIGHT OXIMETRY   2. Central sleep apnea     3. Nocturnal hypoxemia     4. Vasomotor rhinitis, unspecified chronicity     5. BMI 38.0-38.9,adult     6. Pulmonary hypertension           Plan:    1) Reviewed titration study in detail. He continued to have an elevated AHI. However he does not qualify for ASV with only 35% centrals. He did relatively well on Bipap 25/20 with an AHI of 14.3. Order for Bipap 25/20 CM H20. CNOX on this setting prior to follow up visit to ensure adequate 02 saturations.   2) Sleep hygiene discussed in detail. Weight loss recommended.   3) Follow up in 2 months with CNOX and compliance card download, sooner if needed. Would recommend repeating Echo after he has been on treatment for 6 months for follow up on Pulmonary pressures.   "

## 2018-02-20 NOTE — PATIENT INSTRUCTIONS
Plan:    1) Reviewed titration study in detail. He continued to have an elevated AHI. However he does not qualify for ASV with only 35% centrals. He did relatively well on Bipap 25/20 with an AHI of 14.3. Order for Bipap 25/20 CM H20. CNOX on this setting prior to follow up visit to ensure adequate 02 saturations.   2) Sleep hygiene discussed in detail. Weight loss recommended.   3) Follow up in 2 months with CNOX and compliance card download, sooner if needed. Would recommend repeating Echo after he has been on treatment for 6 months for follow up on Pulmonary pressures.

## 2018-02-21 ENCOUNTER — TELEPHONE (OUTPATIENT)
Dept: SLEEP MEDICINE | Facility: MEDICAL CENTER | Age: 58
End: 2018-02-21

## 2018-02-21 NOTE — TELEPHONE ENCOUNTER
Patient is a [place] resident and [facility] ([place], [place]) is classified as OUT-OF-NETWORK for DME.  Checked the insurance website [employer] and [facility] is listed as a contracted provider.    Reprinted order and faxed to [facility] in [place], [place].  [place] is estimated at 35 miles and [place] is estimated at 41 miles from patient's home zip code.  Called [facility] in [place], spoke with Bhaskar.  He said that if the patient cannot be serviced in the [place] office, the order will be forwarded to the designated location based on the patient's home zip code.    [facility]  [facility] /  [phone] / fax [phone]

## 2018-07-17 ENCOUNTER — HOSPITAL ENCOUNTER (OUTPATIENT)
Dept: LAB | Facility: MEDICAL CENTER | Age: 58
End: 2018-07-17
Attending: FAMILY MEDICINE
Payer: COMMERCIAL

## 2018-07-17 LAB
ALBUMIN SERPL BCP-MCNC: 4.1 G/DL (ref 3.2–4.9)
ALBUMIN/GLOB SERPL: 1.5 G/DL
ALP SERPL-CCNC: 55 U/L (ref 30–99)
ALT SERPL-CCNC: 22 U/L (ref 2–50)
ANION GAP SERPL CALC-SCNC: 10 MMOL/L (ref 0–11.9)
AST SERPL-CCNC: 20 U/L (ref 12–45)
BILIRUB SERPL-MCNC: 0.3 MG/DL (ref 0.1–1.5)
BUN SERPL-MCNC: 18 MG/DL (ref 8–22)
CALCIUM SERPL-MCNC: 9.3 MG/DL (ref 8.5–10.5)
CHLORIDE SERPL-SCNC: 107 MMOL/L (ref 96–112)
CHOLEST SERPL-MCNC: 225 MG/DL (ref 100–199)
CO2 SERPL-SCNC: 25 MMOL/L (ref 20–33)
CREAT SERPL-MCNC: 0.89 MG/DL (ref 0.5–1.4)
EST. AVERAGE GLUCOSE BLD GHB EST-MCNC: 148 MG/DL
GLOBULIN SER CALC-MCNC: 2.8 G/DL (ref 1.9–3.5)
GLUCOSE SERPL-MCNC: 114 MG/DL (ref 65–99)
HBA1C MFR BLD: 6.8 % (ref 0–5.6)
HDLC SERPL-MCNC: 48 MG/DL
LDLC SERPL CALC-MCNC: 107 MG/DL
POTASSIUM SERPL-SCNC: 4.5 MMOL/L (ref 3.6–5.5)
PROT SERPL-MCNC: 6.9 G/DL (ref 6–8.2)
PSA SERPL-MCNC: 0.94 NG/ML (ref 0–4)
SODIUM SERPL-SCNC: 142 MMOL/L (ref 135–145)
TRIGL SERPL-MCNC: 348 MG/DL (ref 0–149)

## 2018-07-17 PROCEDURE — 80053 COMPREHEN METABOLIC PANEL: CPT

## 2018-07-17 PROCEDURE — 83036 HEMOGLOBIN GLYCOSYLATED A1C: CPT

## 2018-07-17 PROCEDURE — 80061 LIPID PANEL: CPT

## 2018-07-17 PROCEDURE — 84153 ASSAY OF PSA TOTAL: CPT

## 2018-07-17 PROCEDURE — 36415 COLL VENOUS BLD VENIPUNCTURE: CPT

## 2019-05-07 ENCOUNTER — HOSPITAL ENCOUNTER (EMERGENCY)
Dept: HOSPITAL 8 - ED | Age: 59
Discharge: HOME | End: 2019-05-07
Payer: COMMERCIAL

## 2019-05-07 VITALS — WEIGHT: 273.37 LBS | BODY MASS INDEX: 38.27 KG/M2 | HEIGHT: 71 IN

## 2019-05-07 VITALS — SYSTOLIC BLOOD PRESSURE: 137 MMHG | DIASTOLIC BLOOD PRESSURE: 92 MMHG

## 2019-05-07 DIAGNOSIS — N45.1: Primary | ICD-10-CM

## 2019-05-07 DIAGNOSIS — N43.3: ICD-10-CM

## 2019-05-07 DIAGNOSIS — N50.3: ICD-10-CM

## 2019-05-07 LAB
CULTURE INDICATED?: YES
MICROSCOPIC: (no result)

## 2019-05-07 PROCEDURE — 76870 US EXAM SCROTUM: CPT

## 2019-05-07 PROCEDURE — 87086 URINE CULTURE/COLONY COUNT: CPT

## 2019-05-07 PROCEDURE — 81001 URINALYSIS AUTO W/SCOPE: CPT

## 2019-05-07 PROCEDURE — 99284 EMERGENCY DEPT VISIT MOD MDM: CPT

## 2019-05-14 ENCOUNTER — HOSPITAL ENCOUNTER (OUTPATIENT)
Facility: MEDICAL CENTER | Age: 59
End: 2019-05-14
Attending: EMERGENCY MEDICINE
Payer: COMMERCIAL

## 2019-05-14 ENCOUNTER — OFFICE VISIT (OUTPATIENT)
Dept: URGENT CARE | Facility: PHYSICIAN GROUP | Age: 59
End: 2019-05-14
Payer: COMMERCIAL

## 2019-05-14 VITALS
DIASTOLIC BLOOD PRESSURE: 64 MMHG | HEART RATE: 78 BPM | BODY MASS INDEX: 39.65 KG/M2 | OXYGEN SATURATION: 97 % | WEIGHT: 277 LBS | TEMPERATURE: 98.7 F | RESPIRATION RATE: 16 BRPM | SYSTOLIC BLOOD PRESSURE: 122 MMHG | HEIGHT: 70 IN

## 2019-05-14 DIAGNOSIS — N50.89 SCROTAL SWELLING: ICD-10-CM

## 2019-05-14 DIAGNOSIS — Z87.438 HISTORY OF EPIDIDYMITIS: ICD-10-CM

## 2019-05-14 LAB
APPEARANCE UR: CLEAR
BILIRUB UR STRIP-MCNC: NORMAL MG/DL
COLOR UR AUTO: YELLOW
GLUCOSE UR STRIP.AUTO-MCNC: NORMAL MG/DL
KETONES UR STRIP.AUTO-MCNC: NORMAL MG/DL
LEUKOCYTE ESTERASE UR QL STRIP.AUTO: NORMAL
NITRITE UR QL STRIP.AUTO: NORMAL
PH UR STRIP.AUTO: 7.5 [PH] (ref 5–8)
PROT UR QL STRIP: NORMAL MG/DL
RBC UR QL AUTO: NORMAL
SP GR UR STRIP.AUTO: 1.02
UROBILINOGEN UR STRIP-MCNC: 0.2 MG/DL

## 2019-05-14 PROCEDURE — 81002 URINALYSIS NONAUTO W/O SCOPE: CPT | Performed by: EMERGENCY MEDICINE

## 2019-05-14 PROCEDURE — 81001 URINALYSIS AUTO W/SCOPE: CPT

## 2019-05-14 PROCEDURE — 99203 OFFICE O/P NEW LOW 30 MIN: CPT | Performed by: EMERGENCY MEDICINE

## 2019-05-14 ASSESSMENT — ENCOUNTER SYMPTOMS
ANOREXIA: 0
FEVER: 0
CHANGE IN BOWEL HABIT: 0
VOMITING: 0
ABDOMINAL PAIN: 0
SHORTNESS OF BREATH: 0
NAUSEA: 0
FLANK PAIN: 0
BACK PAIN: 1

## 2019-05-14 NOTE — PROGRESS NOTES
Subjective:      Nilesh Huynh is a 58 y.o. male who presents with Testicle Swelling (Seen at [facility] for swollen left testicle on 5/7/19.  They prescribed Ceftinir and was told to follow up.  Urology appointment in June.  )            Other   This is a new problem. Episode onset: over one week ago. The problem has been gradually improving. Pertinent negatives include no abdominal pain, anorexia, change in bowel habit, fever, nausea, rash, urinary symptoms or vomiting. Nothing aggravates the symptoms. Treatments tried: Ceftin. The treatment provided moderate relief.   Patient states noted scrotal swelling with urinary hesitancy; seen in ED, had ultrasound and urine testing done.  Advised that he had a cyst, infection; prescribed Ceftin.  Advised follow-up with urologist; appointment in about 3 weeks.  No trauma; no urinary retention.    Review of Systems   Constitutional: Negative for fever.   Respiratory: Negative for shortness of breath.    Cardiovascular: Positive for leg swelling.        Chronic, intermittent   Gastrointestinal: Negative for abdominal pain, anorexia, change in bowel habit, nausea and vomiting.   Genitourinary: Positive for urgency. Negative for dysuria, flank pain, frequency and hematuria.        No penile discharge, incontinence   Musculoskeletal: Positive for back pain.        Chronic, unchanged   Skin: Negative for rash.     PMH:  has a past medical history of Back pain; Diabetes; Prostate disorder; and Sleep apnea.  MEDS:   Current Outpatient Prescriptions:   •  morphine (MS IR) 30 MG tablet, Take 30 mg by mouth every four hours as needed for Severe Pain., Disp: , Rfl:   •  ibuprofen (MOTRIN) 800 MG Tab, Take 800 mg by mouth., Disp: , Rfl:   •  Oxycodone-Acetaminophen (PERCOCET-10)  MG TABS, Take 1-2 Tabs by mouth every four hours as needed for Moderate Pain., Disp: 15 Tab, Rfl:   •  gabapentin (NEURONTIN) 400 MG CAPS, Take 400 mg by mouth 6 Times a Day. Taking 7 tablets per  "day., Disp: , Rfl:   •  gabapentin (NEURONTIN) 400 MG Cap, Take 400 mg by mouth., Disp: , Rfl:   •  alfuzosin (UROXATRAL) 10 MG SR tablet, Take 10 mg by mouth., Disp: , Rfl:   •  VENTOLIN  (90 Base) MCG/ACT Aero Soln inhalation aerosol, , Disp: , Rfl:   •  alfuzosin (UROXATRAL) 10 MG SR tablet, Take 1 Tab by mouth every evening. (Patient not taking: Reported on 8/29/2017), Disp: , Rfl:   •  clonazepam (KLONOPIN) 1 MG TABS, Take 1 Tab by mouth 3 times a day. (Patient not taking: Reported on 8/29/2017), Disp: 60 Tab, Rfl:   •  methocarbamol (ROBAXIN) 750 MG TABS, Take 1 Tab by mouth 3 times a day. (Patient not taking: Reported on 8/29/2017), Disp: 120 Tab, Rfl:   •  oxycodone CR (OXYCONTIN) 10 MG TB12, Take 1 Tab by mouth 3 times a day. (Patient not taking: Reported on 5/14/2019), Disp: 60 Tab, Rfl:   •  senna-docusate (PERICOLACE OR SENOKOT S) 8.6-50 MG TABS, Take 2 Tabs by mouth every day. (Patient not taking: Reported on 8/29/2017), Disp: 30 Tab, Rfl:   ALLERGIES: No Known Allergies  SURGHX:   Past Surgical History:   Procedure Laterality Date   • LAMINOTOMY  6/3/2013    Performed by Jamar Sabillon M.D. at SURGERY [facility]S   • LUMBAR LAMINECTOMY DISKECTOMY      2001     SOCHX:  reports that he has never smoked. He has never used smokeless tobacco. He reports that he drinks about 0.6 - 1.2 oz of alcohol per week . He reports that he does not use drugs.  FH: family history includes No Known Problems in his father and mother.       Objective:     /64   Pulse 78   Temp 37.1 °C (98.7 °F) (Temporal)   Resp 16   Ht 1.778 m (5' 10\")   Wt (!) 125.6 kg (277 lb)   SpO2 97%   BMI 39.75 kg/m²      Physical Exam   Constitutional: He is oriented to person, place, and time. He appears well-developed and well-nourished. He is cooperative.  Non-toxic appearance. He does not have a sickly appearance. He does not appear ill. No distress.   Cardiovascular: Normal rate, regular rhythm and normal heart sounds.  "   1+ pedal edema   Pulmonary/Chest: Effort normal and breath sounds normal.   Abdominal: Soft. He exhibits no distension. There is no tenderness. There is no CVA tenderness. Hernia confirmed negative in the right inguinal area and confirmed negative in the left inguinal area.   Genitourinary: Right testis shows swelling. Right testis shows no tenderness. Cremasteric reflex is not absent on the right side. Left testis shows swelling. Left testis shows no tenderness. Cremasteric reflex is not absent on the left side. Circumcised. No penile erythema. No discharge found.   Lymphadenopathy: No inguinal adenopathy noted on the right or left side.   Neurological: He is alert and oriented to person, place, and time.   Skin: Skin is warm and dry.   Psychiatric: He has a normal mood and affect.          No urinary retention, symptoms appear to be improved.  No apparent immediate need for continued antibiotic therapy.     Assessment/Plan:     1. Scrotal swelling  Trace LE- POCT Urinalysis  UA reflex to culture sent  - [id number]; Future  REF UROLOGY  2. History of epididymitis

## 2019-05-15 DIAGNOSIS — Z87.438 HISTORY OF EPIDIDYMITIS: ICD-10-CM

## 2019-05-15 DIAGNOSIS — N50.89 SCROTAL SWELLING: ICD-10-CM

## 2019-05-16 LAB
APPEARANCE UR: CLEAR
BACTERIA #/AREA URNS HPF: NEGATIVE /HPF
COLOR UR: YELLOW
EPI CELLS #/AREA URNS HPF: NEGATIVE /HPF
GLUCOSE UR STRIP.AUTO-MCNC: NEGATIVE MG/DL
KETONES UR STRIP.AUTO-MCNC: NEGATIVE MG/DL
LEUKOCYTE ESTERASE UR QL STRIP.AUTO: NEGATIVE
MICRO URNS: ABNORMAL
NITRITE UR QL STRIP.AUTO: NEGATIVE
PH UR STRIP.AUTO: 8 [PH]
PROT UR QL STRIP: 30 MG/DL
RBC # URNS HPF: ABNORMAL /HPF
RBC UR QL AUTO: NEGATIVE
SP GR UR STRIP.AUTO: 1.01
WBC #/AREA URNS HPF: ABNORMAL /HPF

## 2019-09-29 ENCOUNTER — OFFICE VISIT (OUTPATIENT)
Dept: URGENT CARE | Facility: PHYSICIAN GROUP | Age: 59
End: 2019-09-29
Payer: COMMERCIAL

## 2019-09-29 VITALS
HEART RATE: 66 BPM | WEIGHT: 277 LBS | HEIGHT: 70 IN | RESPIRATION RATE: 18 BRPM | TEMPERATURE: 97.8 F | DIASTOLIC BLOOD PRESSURE: 78 MMHG | BODY MASS INDEX: 39.65 KG/M2 | SYSTOLIC BLOOD PRESSURE: 122 MMHG | OXYGEN SATURATION: 89 %

## 2019-09-29 DIAGNOSIS — J45.21 MILD INTERMITTENT ASTHMA WITH ACUTE EXACERBATION: Primary | ICD-10-CM

## 2019-09-29 DIAGNOSIS — R05.9 COUGH: ICD-10-CM

## 2019-09-29 DIAGNOSIS — R06.2 WHEEZE: ICD-10-CM

## 2019-09-29 PROCEDURE — 99214 OFFICE O/P EST MOD 30 MIN: CPT | Performed by: PHYSICIAN ASSISTANT

## 2019-09-29 RX ORDER — METHYLPREDNISOLONE 4 MG/1
TABLET ORAL
Qty: 1 KIT | Refills: 0 | Status: SHIPPED | OUTPATIENT
Start: 2019-09-29

## 2019-09-29 RX ORDER — IPRATROPIUM BROMIDE AND ALBUTEROL SULFATE 2.5; .5 MG/3ML; MG/3ML
3 SOLUTION RESPIRATORY (INHALATION) ONCE
Status: COMPLETED | OUTPATIENT
Start: 2019-09-29 | End: 2019-09-29

## 2019-09-29 RX ORDER — AZITHROMYCIN 250 MG/1
TABLET, FILM COATED ORAL
Qty: 6 TAB | Refills: 0 | Status: SHIPPED | OUTPATIENT
Start: 2019-09-29

## 2019-09-29 RX ORDER — AZITHROMYCIN 250 MG/1
TABLET, FILM COATED ORAL
Qty: 6 TAB | Refills: 0 | Status: SHIPPED | OUTPATIENT
Start: 2019-09-29 | End: 2019-09-29

## 2019-09-29 RX ORDER — METHYLPREDNISOLONE 4 MG/1
TABLET ORAL
Qty: 1 KIT | Refills: 0 | Status: SHIPPED | OUTPATIENT
Start: 2019-09-29 | End: 2019-09-29

## 2019-09-29 RX ORDER — ALBUTEROL SULFATE 90 UG/1
2 AEROSOL, METERED RESPIRATORY (INHALATION) EVERY 4 HOURS PRN
Qty: 1 INHALER | Refills: 0 | Status: SHIPPED | OUTPATIENT
Start: 2019-09-29

## 2019-09-29 RX ADMIN — IPRATROPIUM BROMIDE AND ALBUTEROL SULFATE 3 ML: 2.5; .5 SOLUTION RESPIRATORY (INHALATION) at 17:14

## 2019-09-30 NOTE — PROGRESS NOTES
Subjective:      Nilesh Huynh is a 59 y.o. male who presents with Asthma (asthma attack, he states that his albuterol helps, but it hasn't been helping lately. )    PMH:  has a past medical history of Back pain, Diabetes, Prostate disorder, and Sleep apnea.  MEDS:   Current Outpatient Medications:   •  methylPREDNISolone (MEDROL DOSEPAK) 4 MG Tablet Therapy Pack, Take as directed, Disp: 1 Kit, Rfl: 0  •  azithromycin (ZITHROMAX) 250 MG Tab, Take 2 tabs by mouth once today, then one tab by mouth once daily days 2-5.  Complete all medication., Disp: 6 Tab, Rfl: 0  •  morphine (MS IR) 30 MG tablet, Take 30 mg by mouth every four hours as needed for Severe Pain., Disp: , Rfl:   •  ibuprofen (MOTRIN) 800 MG Tab, Take 800 mg by mouth., Disp: , Rfl:   •  gabapentin (NEURONTIN) 400 MG Cap, Take 400 mg by mouth., Disp: , Rfl:   •  alfuzosin (UROXATRAL) 10 MG SR tablet, Take 10 mg by mouth., Disp: , Rfl:   •  VENTOLIN  (90 Base) MCG/ACT Aero Soln inhalation aerosol, , Disp: , Rfl:   •  alfuzosin (UROXATRAL) 10 MG SR tablet, Take 1 Tab by mouth every evening., Disp: , Rfl:   •  clonazepam (KLONOPIN) 1 MG TABS, Take 1 Tab by mouth 3 times a day. (Patient not taking: Reported on 8/29/2017), Disp: 60 Tab, Rfl:   •  methocarbamol (ROBAXIN) 750 MG TABS, Take 1 Tab by mouth 3 times a day. (Patient not taking: Reported on 8/29/2017), Disp: 120 Tab, Rfl:   •  oxycodone CR (OXYCONTIN) 10 MG TB12, Take 1 Tab by mouth 3 times a day. (Patient not taking: Reported on 5/14/2019), Disp: 60 Tab, Rfl:   •  Oxycodone-Acetaminophen (PERCOCET-10)  MG TABS, Take 1-2 Tabs by mouth every four hours as needed for Moderate Pain., Disp: 15 Tab, Rfl:   •  senna-docusate (PERICOLACE OR SENOKOT S) 8.6-50 MG TABS, Take 2 Tabs by mouth every day. (Patient not taking: Reported on 8/29/2017), Disp: 30 Tab, Rfl:   •  gabapentin (NEURONTIN) 400 MG CAPS, Take 400 mg by mouth 6 Times a Day. Taking 7 tablets per day., Disp: , Rfl:   ALLERGIES:  No Known Allergies  SURGHX:   Past Surgical History:   Procedure Laterality Date   • LAMINOTOMY  6/3/2013    Performed by Jamar Sabillon M.D. at SURGERY [facility] ORS   • LUMBAR LAMINECTOMY DISKECTOMY      2001     SOCHX:  reports that he has never smoked. He has never used smokeless tobacco. He reports that he drinks about 0.6 - 1.2 oz of alcohol per week. He reports that he does not use drugs.  FH: Reviewed with patient, not pertinent to this visit.           Patient presents with:  Asthma: asthma attack, he states that his albuterol helps, but it hasn't been helping lately.  Pt has been seen by PCP about 3 weeks ago for same complaint, given steroids before and those have been helpful in the past.   Denies lower extremity swelling, orthopnea or PND.          Asthma   He complains of chest tightness, cough, frequent throat clearing, shortness of breath and wheezing. There is no hemoptysis or sputum production. The current episode started 1 to 4 weeks ago. The problem occurs daily. The problem has been waxing and waning. The cough is non-productive and hacking. Pertinent negatives include no appetite change, chest pain, fever or malaise/fatigue. His symptoms are aggravated by change in weather, strenuous activity, lying down and URI. His symptoms are alleviated by beta-agonist. He reports minimal improvement on treatment. His past medical history is significant for asthma and bronchitis.       Review of Systems   Constitutional: Negative for appetite change, chills, fever and malaise/fatigue.   Respiratory: Positive for cough, shortness of breath and wheezing. Negative for hemoptysis and sputum production.    Cardiovascular: Negative for chest pain, orthopnea and leg swelling.   Neurological: Negative for dizziness.   All other systems reviewed and are negative.         Objective:     /78 (BP Location: Left arm, Patient Position: Sitting, BP Cuff Size: Large adult)   Pulse 66   Temp 36.6 °C (97.8 °F)  "(Temporal)   Resp 18   Ht 1.778 m (5' 10\")   Wt (!) 125.6 kg (277 lb)   SpO2 89%   BMI 39.75 kg/m²      Physical Exam   Constitutional: He is oriented to person, place, and time. He appears well-developed and well-nourished. No distress.   HENT:   Head: Normocephalic and atraumatic.   Eyes: Pupils are equal, round, and reactive to light. EOM are normal.   Neck: Normal range of motion. Neck supple.   Cardiovascular: Normal rate.   Pulmonary/Chest: Effort normal. No stridor. No respiratory distress. He has wheezes. He has no rales.   Abdominal: Soft.   Musculoskeletal: Normal range of motion.   Neurological: He is alert and oriented to person, place, and time.   Skin: Skin is warm and dry.   Psychiatric: He has a normal mood and affect.   Nursing note and vitals reviewed.         Pt states symptoms have improved after neb treatment, on re-eval wheezing has improved and air movement better throughout.      02 sat improved to 92% on RA after neb treatment.    Pt declined chest xray, EKG today, states he knows this is asthma, not cardiac.   Assessment/Plan:     1. Mild intermittent asthma with acute exacerbation  methylPREDNISolone (MEDROL DOSEPAK) 4 MG Tablet Therapy Pack    azithromycin (ZITHROMAX) 250 MG Tab   2. Wheeze  ipratropium-albuterol (DUONEB) nebulizer solution    methylPREDNISolone (MEDROL DOSEPAK) 4 MG Tablet Therapy Pack    azithromycin (ZITHROMAX) 250 MG Tab   3. Cough  methylPREDNISolone (MEDROL DOSEPAK) 4 MG Tablet Therapy Pack    azithromycin (ZITHROMAX) 250 MG Tab     PT to continue taking prescription medications as prescribed.      Add new Rx steroids and antibiotics today.      PT should follow up with PCP in 1-2 days for re-evaluation if symptoms have not improved.  Discussed red flags and reasons to return to UC or ED.  Pt and/or family verbalized understanding of diagnosis and follow up instructions and was offered informational handout on diagnosis.  PT discharged.     "

## 2019-10-02 ASSESSMENT — ENCOUNTER SYMPTOMS
COUGH: 1
SPUTUM PRODUCTION: 0
CHEST TIGHTNESS: 1
APPETITE CHANGE: 0
FEVER: 0
FREQUENT THROAT CLEARING: 1
CHILLS: 0
ORTHOPNEA: 0
HEMOPTYSIS: 0
SHORTNESS OF BREATH: 1
DIZZINESS: 0
WHEEZING: 1

## 2021-03-08 ENCOUNTER — HOSPITAL ENCOUNTER (INPATIENT)
Dept: HOSPITAL 8 - ED | Age: 61
LOS: 4 days | Discharge: HOME | DRG: 193 | End: 2021-03-12
Attending: INTERNAL MEDICINE | Admitting: HOSPITALIST
Payer: COMMERCIAL

## 2021-03-08 VITALS — HEIGHT: 70 IN | WEIGHT: 295.42 LBS | BODY MASS INDEX: 42.29 KG/M2

## 2021-03-08 DIAGNOSIS — J96.21: ICD-10-CM

## 2021-03-08 DIAGNOSIS — Z87.891: ICD-10-CM

## 2021-03-08 DIAGNOSIS — J44.1: ICD-10-CM

## 2021-03-08 DIAGNOSIS — J44.0: ICD-10-CM

## 2021-03-08 DIAGNOSIS — G89.29: ICD-10-CM

## 2021-03-08 DIAGNOSIS — J18.9: Primary | ICD-10-CM

## 2021-03-08 DIAGNOSIS — Z20.822: ICD-10-CM

## 2021-03-08 DIAGNOSIS — G47.33: ICD-10-CM

## 2021-03-08 DIAGNOSIS — E66.01: ICD-10-CM

## 2021-03-08 DIAGNOSIS — E11.9: ICD-10-CM

## 2021-03-08 DIAGNOSIS — T38.0X5A: ICD-10-CM

## 2021-03-08 DIAGNOSIS — F11.20: ICD-10-CM
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
## 2021-03-08 PROCEDURE — 85651 RBC SED RATE NONAUTOMATED: CPT

## 2021-03-08 PROCEDURE — 87040 BLOOD CULTURE FOR BACTERIA: CPT

## 2021-03-08 PROCEDURE — 83615 LACTATE (LD) (LDH) ENZYME: CPT

## 2021-03-08 PROCEDURE — 83036 HEMOGLOBIN GLYCOSYLATED A1C: CPT

## 2021-03-08 PROCEDURE — 99285 EMERGENCY DEPT VISIT HI MDM: CPT

## 2021-03-08 PROCEDURE — 84145 PROCALCITONIN (PCT): CPT

## 2021-03-08 PROCEDURE — 36415 COLL VENOUS BLD VENIPUNCTURE: CPT

## 2021-03-08 PROCEDURE — 93005 ELECTROCARDIOGRAM TRACING: CPT

## 2021-03-08 PROCEDURE — U0003 INFECTIOUS AGENT DETECTION BY NUCLEIC ACID (DNA OR RNA); SEVERE ACUTE RESPIRATORY SYNDROME CORONAVIRUS 2 (SARS-COV-2) (CORONAVIRUS DISEASE [COVID-19]), AMPLIFIED PROBE TECHNIQUE, MAKING USE OF HIGH THROUGHPUT TECHNOLOGIES AS DESCRIBED BY CMS-2020-01-R: HCPCS

## 2021-03-08 PROCEDURE — 96374 THER/PROPH/DIAG INJ IV PUSH: CPT

## 2021-03-08 PROCEDURE — 85379 FIBRIN DEGRADATION QUANT: CPT

## 2021-03-08 PROCEDURE — 85025 COMPLETE CBC W/AUTO DIFF WBC: CPT

## 2021-03-08 PROCEDURE — 82962 GLUCOSE BLOOD TEST: CPT

## 2021-03-08 PROCEDURE — 82040 ASSAY OF SERUM ALBUMIN: CPT

## 2021-03-08 PROCEDURE — 87400 INFLUENZA A/B EACH AG IA: CPT

## 2021-03-08 PROCEDURE — 80048 BASIC METABOLIC PNL TOTAL CA: CPT

## 2021-03-08 PROCEDURE — 96375 TX/PRO/DX INJ NEW DRUG ADDON: CPT

## 2021-03-09 VITALS — DIASTOLIC BLOOD PRESSURE: 86 MMHG | SYSTOLIC BLOOD PRESSURE: 121 MMHG

## 2021-03-09 VITALS — SYSTOLIC BLOOD PRESSURE: 128 MMHG | DIASTOLIC BLOOD PRESSURE: 73 MMHG

## 2021-03-09 VITALS — SYSTOLIC BLOOD PRESSURE: 132 MMHG | DIASTOLIC BLOOD PRESSURE: 75 MMHG

## 2021-03-09 VITALS — SYSTOLIC BLOOD PRESSURE: 123 MMHG | DIASTOLIC BLOOD PRESSURE: 78 MMHG

## 2021-03-09 LAB
ALBUMIN SERPL-MCNC: 3.7 G/DL (ref 3.4–5)
ANION GAP SERPL CALC-SCNC: 7 MMOL/L (ref 5–15)
BASOPHILS # BLD AUTO: 0 X10^3/UL (ref 0–0.1)
BASOPHILS NFR BLD AUTO: 0 % (ref 0–1)
CALCIUM SERPL-MCNC: 8.6 MG/DL (ref 8.5–10.1)
CHLORIDE SERPL-SCNC: 108 MMOL/L (ref 98–107)
CREAT SERPL-MCNC: 0.92 MG/DL (ref 0.7–1.3)
EOSINOPHIL # BLD AUTO: 0 X10^3/UL (ref 0–0.4)
EOSINOPHIL NFR BLD AUTO: 0 % (ref 1–7)
ERYTHROCYTE [DISTWIDTH] IN BLOOD BY AUTOMATED COUNT: 14.1 % (ref 9.4–14.8)
ERYTHROCYTE [SEDIMENTATION RATE] IN BLOOD BY WESTERGREN METHOD: 27 MM/HR (ref 0–10)
EST. AVERAGE GLUCOSE BLD GHB EST-MCNC: 148 MG/DL (ref 0–126)
HCT (SEDRATE): 42.1 % (ref 39.2–51.8)
LYMPHOCYTES # BLD AUTO: 0.6 X10^3/UL (ref 1–3.4)
LYMPHOCYTES NFR BLD AUTO: 6 % (ref 22–44)
MCH RBC QN AUTO: 31.4 PG (ref 27.5–34.5)
MCHC RBC AUTO-ENTMCNC: 34 G/DL (ref 33.2–36.2)
MD: NO
MONOCYTES # BLD AUTO: 0.2 X10^3/UL (ref 0.2–0.8)
MONOCYTES NFR BLD AUTO: 2 % (ref 2–9)
NEUTROPHILS # BLD AUTO: 8.3 X10^3/UL (ref 1.8–6.8)
NEUTROPHILS NFR BLD AUTO: 92 % (ref 42–75)
PLATELET # BLD AUTO: 198 X10^3/UL (ref 130–400)
PMV BLD AUTO: 8.4 FL (ref 7.4–10.4)
RBC # BLD AUTO: 4.87 X10^6/UL (ref 4.38–5.82)

## 2021-03-09 RX ADMIN — OXYCODONE HYDROCHLORIDE AND ACETAMINOPHEN SCH TAB: 10; 325 TABLET ORAL at 21:01

## 2021-03-09 RX ADMIN — ENOXAPARIN SODIUM SCH MG: 30 INJECTION SUBCUTANEOUS at 17:19

## 2021-03-09 RX ADMIN — OXYCODONE HYDROCHLORIDE AND ACETAMINOPHEN SCH TAB: 10; 325 TABLET ORAL at 09:05

## 2021-03-09 RX ADMIN — BENZONATATE PRN MG: 100 CAPSULE ORAL at 05:31

## 2021-03-09 RX ADMIN — AZITHROMYCIN FOR INJECTION INJECTION, POWDER, LYOPHILIZED, FOR SOLUTION SCH MLS/HR: 500 INJECTION INTRAVENOUS at 05:26

## 2021-03-09 RX ADMIN — GABAPENTIN SCH MG: 400 CAPSULE ORAL at 13:34

## 2021-03-09 RX ADMIN — GABAPENTIN SCH MG: 400 CAPSULE ORAL at 21:01

## 2021-03-09 RX ADMIN — ENOXAPARIN SODIUM SCH MG: 30 INJECTION SUBCUTANEOUS at 05:32

## 2021-03-09 RX ADMIN — OXYCODONE HYDROCHLORIDE AND ACETAMINOPHEN SCH TAB: 10; 325 TABLET ORAL at 15:39

## 2021-03-09 RX ADMIN — INSULIN LISPRO SCH UNITS: 100 INJECTION, SOLUTION INTRAVENOUS; SUBCUTANEOUS at 21:00

## 2021-03-09 RX ADMIN — BENZONATATE PRN MG: 100 CAPSULE ORAL at 22:53

## 2021-03-09 RX ADMIN — GABAPENTIN SCH MG: 400 CAPSULE ORAL at 05:32

## 2021-03-09 RX ADMIN — METHYLPREDNISOLONE SODIUM SUCCINATE SCH MG: 125 INJECTION, POWDER, FOR SOLUTION INTRAMUSCULAR; INTRAVENOUS at 17:18

## 2021-03-09 RX ADMIN — BENZONATATE PRN MG: 100 CAPSULE ORAL at 13:34

## 2021-03-10 VITALS — SYSTOLIC BLOOD PRESSURE: 144 MMHG | DIASTOLIC BLOOD PRESSURE: 79 MMHG

## 2021-03-10 VITALS — DIASTOLIC BLOOD PRESSURE: 73 MMHG | SYSTOLIC BLOOD PRESSURE: 116 MMHG

## 2021-03-10 VITALS — DIASTOLIC BLOOD PRESSURE: 76 MMHG | SYSTOLIC BLOOD PRESSURE: 131 MMHG

## 2021-03-10 VITALS — SYSTOLIC BLOOD PRESSURE: 137 MMHG | DIASTOLIC BLOOD PRESSURE: 76 MMHG

## 2021-03-10 LAB
ANION GAP SERPL CALC-SCNC: 4 MMOL/L (ref 5–15)
BASOPHILS # BLD AUTO: 0 X10^3/UL (ref 0–0.1)
BASOPHILS NFR BLD AUTO: 0 % (ref 0–1)
CALCIUM SERPL-MCNC: 8.7 MG/DL (ref 8.5–10.1)
CHLORIDE SERPL-SCNC: 103 MMOL/L (ref 98–107)
CREAT SERPL-MCNC: 0.86 MG/DL (ref 0.7–1.3)
EOSINOPHIL # BLD AUTO: 0 X10^3/UL (ref 0–0.4)
EOSINOPHIL NFR BLD AUTO: 0 % (ref 1–7)
ERYTHROCYTE [DISTWIDTH] IN BLOOD BY AUTOMATED COUNT: 14.3 % (ref 9.4–14.8)
LYMPHOCYTES # BLD AUTO: 1.2 X10^3/UL (ref 1–3.4)
LYMPHOCYTES NFR BLD AUTO: 8 % (ref 22–44)
MCH RBC QN AUTO: 30.8 PG (ref 27.5–34.5)
MCHC RBC AUTO-ENTMCNC: 32.6 G/DL (ref 33.2–36.2)
MD: (no result)
MONOCYTES # BLD AUTO: 0.5 X10^3/UL (ref 0.2–0.8)
MONOCYTES NFR BLD AUTO: 4 % (ref 2–9)
NEUTROPHILS # BLD AUTO: 13 X10^3/UL (ref 1.8–6.8)
NEUTROPHILS NFR BLD AUTO: 88 % (ref 42–75)
PLATELET # BLD AUTO: 228 X10^3/UL (ref 130–400)
PMV BLD AUTO: 8.5 FL (ref 7.4–10.4)
RBC # BLD AUTO: 4.64 X10^6/UL (ref 4.38–5.82)

## 2021-03-10 RX ADMIN — METHYLPREDNISOLONE SODIUM SUCCINATE SCH MG: 125 INJECTION, POWDER, FOR SOLUTION INTRAMUSCULAR; INTRAVENOUS at 08:52

## 2021-03-10 RX ADMIN — INSULIN LISPRO SCH UNITS: 100 INJECTION, SOLUTION INTRAVENOUS; SUBCUTANEOUS at 20:29

## 2021-03-10 RX ADMIN — CEFTRIAXONE SCH MLS/HR: 1 INJECTION, SOLUTION INTRAVENOUS at 02:16

## 2021-03-10 RX ADMIN — Medication PRN MG: at 02:44

## 2021-03-10 RX ADMIN — METHYLPREDNISOLONE SODIUM SUCCINATE SCH MG: 125 INJECTION, POWDER, FOR SOLUTION INTRAMUSCULAR; INTRAVENOUS at 00:05

## 2021-03-10 RX ADMIN — OXYCODONE HYDROCHLORIDE AND ACETAMINOPHEN SCH TAB: 10; 325 TABLET ORAL at 08:53

## 2021-03-10 RX ADMIN — AZITHROMYCIN FOR INJECTION INJECTION, POWDER, LYOPHILIZED, FOR SOLUTION SCH MLS/HR: 500 INJECTION INTRAVENOUS at 04:53

## 2021-03-10 RX ADMIN — GABAPENTIN SCH MG: 400 CAPSULE ORAL at 04:53

## 2021-03-10 RX ADMIN — Medication PRN MG: at 22:31

## 2021-03-10 RX ADMIN — INSULIN LISPRO SCH UNITS: 100 INJECTION, SOLUTION INTRAVENOUS; SUBCUTANEOUS at 10:59

## 2021-03-10 RX ADMIN — GABAPENTIN SCH MG: 400 CAPSULE ORAL at 12:51

## 2021-03-10 RX ADMIN — ENOXAPARIN SODIUM SCH MG: 30 INJECTION SUBCUTANEOUS at 04:54

## 2021-03-10 RX ADMIN — INSULIN LISPRO SCH UNITS: 100 INJECTION, SOLUTION INTRAVENOUS; SUBCUTANEOUS at 07:36

## 2021-03-10 RX ADMIN — OXYCODONE HYDROCHLORIDE AND ACETAMINOPHEN SCH TAB: 10; 325 TABLET ORAL at 20:29

## 2021-03-10 RX ADMIN — OXYCODONE HYDROCHLORIDE AND ACETAMINOPHEN SCH TAB: 10; 325 TABLET ORAL at 16:14

## 2021-03-10 RX ADMIN — INSULIN LISPRO SCH UNITS: 100 INJECTION, SOLUTION INTRAVENOUS; SUBCUTANEOUS at 16:24

## 2021-03-10 RX ADMIN — GABAPENTIN SCH MG: 400 CAPSULE ORAL at 20:29

## 2021-03-10 RX ADMIN — ENOXAPARIN SODIUM SCH MG: 30 INJECTION SUBCUTANEOUS at 16:13

## 2021-03-10 RX ADMIN — METHYLPREDNISOLONE SODIUM SUCCINATE SCH MG: 125 INJECTION, POWDER, FOR SOLUTION INTRAMUSCULAR; INTRAVENOUS at 16:13

## 2021-03-11 VITALS — DIASTOLIC BLOOD PRESSURE: 84 MMHG | SYSTOLIC BLOOD PRESSURE: 130 MMHG

## 2021-03-11 VITALS — SYSTOLIC BLOOD PRESSURE: 130 MMHG | DIASTOLIC BLOOD PRESSURE: 84 MMHG

## 2021-03-11 VITALS — SYSTOLIC BLOOD PRESSURE: 116 MMHG | DIASTOLIC BLOOD PRESSURE: 68 MMHG

## 2021-03-11 VITALS — SYSTOLIC BLOOD PRESSURE: 149 MMHG | DIASTOLIC BLOOD PRESSURE: 82 MMHG

## 2021-03-11 VITALS — SYSTOLIC BLOOD PRESSURE: 137 MMHG | DIASTOLIC BLOOD PRESSURE: 83 MMHG

## 2021-03-11 RX ADMIN — ENOXAPARIN SODIUM SCH MG: 30 INJECTION SUBCUTANEOUS at 04:28

## 2021-03-11 RX ADMIN — OXYCODONE HYDROCHLORIDE AND ACETAMINOPHEN SCH TAB: 10; 325 TABLET ORAL at 09:12

## 2021-03-11 RX ADMIN — OXYCODONE HYDROCHLORIDE AND ACETAMINOPHEN SCH TAB: 10; 325 TABLET ORAL at 16:20

## 2021-03-11 RX ADMIN — OXYCODONE HYDROCHLORIDE AND ACETAMINOPHEN SCH TAB: 10; 325 TABLET ORAL at 20:42

## 2021-03-11 RX ADMIN — METHYLPREDNISOLONE SODIUM SUCCINATE SCH MG: 125 INJECTION, POWDER, FOR SOLUTION INTRAMUSCULAR; INTRAVENOUS at 09:12

## 2021-03-11 RX ADMIN — CEFTRIAXONE SCH MLS/HR: 1 INJECTION, SOLUTION INTRAVENOUS at 02:17

## 2021-03-11 RX ADMIN — ENOXAPARIN SODIUM SCH MG: 30 INJECTION SUBCUTANEOUS at 16:23

## 2021-03-11 RX ADMIN — INSULIN LISPRO SCH UNITS: 100 INJECTION, SOLUTION INTRAVENOUS; SUBCUTANEOUS at 21:19

## 2021-03-11 RX ADMIN — AZITHROMYCIN FOR INJECTION INJECTION, POWDER, LYOPHILIZED, FOR SOLUTION SCH MLS/HR: 500 INJECTION INTRAVENOUS at 04:28

## 2021-03-11 RX ADMIN — GABAPENTIN SCH MG: 400 CAPSULE ORAL at 13:15

## 2021-03-11 RX ADMIN — GABAPENTIN SCH MG: 400 CAPSULE ORAL at 20:42

## 2021-03-11 RX ADMIN — INSULIN LISPRO SCH UNITS: 100 INJECTION, SOLUTION INTRAVENOUS; SUBCUTANEOUS at 16:20

## 2021-03-11 RX ADMIN — INSULIN LISPRO SCH UNITS: 100 INJECTION, SOLUTION INTRAVENOUS; SUBCUTANEOUS at 07:13

## 2021-03-11 RX ADMIN — GABAPENTIN SCH MG: 400 CAPSULE ORAL at 04:28

## 2021-03-11 RX ADMIN — METHYLPREDNISOLONE SODIUM SUCCINATE SCH MG: 40 INJECTION, POWDER, FOR SOLUTION INTRAMUSCULAR; INTRAVENOUS at 20:42

## 2021-03-11 RX ADMIN — INSULIN LISPRO SCH UNITS: 100 INJECTION, SOLUTION INTRAVENOUS; SUBCUTANEOUS at 11:41

## 2021-03-11 RX ADMIN — METHYLPREDNISOLONE SODIUM SUCCINATE SCH MG: 125 INJECTION, POWDER, FOR SOLUTION INTRAMUSCULAR; INTRAVENOUS at 00:25

## 2021-03-12 VITALS — DIASTOLIC BLOOD PRESSURE: 72 MMHG | SYSTOLIC BLOOD PRESSURE: 144 MMHG

## 2021-03-12 VITALS — SYSTOLIC BLOOD PRESSURE: 129 MMHG | DIASTOLIC BLOOD PRESSURE: 68 MMHG

## 2021-03-12 VITALS — DIASTOLIC BLOOD PRESSURE: 97 MMHG | SYSTOLIC BLOOD PRESSURE: 162 MMHG

## 2021-03-12 LAB
ANION GAP SERPL CALC-SCNC: 4 MMOL/L (ref 5–15)
BASOPHILS # BLD AUTO: 0 X10^3/UL (ref 0–0.1)
BASOPHILS NFR BLD AUTO: 0 % (ref 0–1)
CALCIUM SERPL-MCNC: 8.3 MG/DL (ref 8.5–10.1)
CHLORIDE SERPL-SCNC: 104 MMOL/L (ref 98–107)
CREAT SERPL-MCNC: 0.76 MG/DL (ref 0.7–1.3)
EOSINOPHIL # BLD AUTO: 0 X10^3/UL (ref 0–0.4)
EOSINOPHIL NFR BLD AUTO: 0 % (ref 1–7)
ERYTHROCYTE [DISTWIDTH] IN BLOOD BY AUTOMATED COUNT: 13.8 % (ref 9.4–14.8)
LYMPHOCYTES # BLD AUTO: 1.5 X10^3/UL (ref 1–3.4)
LYMPHOCYTES NFR BLD AUTO: 12 % (ref 22–44)
MCH RBC QN AUTO: 31 PG (ref 27.5–34.5)
MCHC RBC AUTO-ENTMCNC: 33.3 G/DL (ref 33.2–36.2)
MD: (no result)
MONOCYTES # BLD AUTO: 0.6 X10^3/UL (ref 0.2–0.8)
MONOCYTES NFR BLD AUTO: 5 % (ref 2–9)
NEUTROPHILS # BLD AUTO: 10.4 X10^3/UL (ref 1.8–6.8)
NEUTROPHILS NFR BLD AUTO: 83 % (ref 42–75)
PLATELET # BLD AUTO: 196 X10^3/UL (ref 130–400)
PMV BLD AUTO: 8.8 FL (ref 7.4–10.4)
RBC # BLD AUTO: 4.49 X10^6/UL (ref 4.38–5.82)

## 2021-03-12 PROCEDURE — 5A09357 ASSISTANCE WITH RESPIRATORY VENTILATION, LESS THAN 24 CONSECUTIVE HOURS, CONTINUOUS POSITIVE AIRWAY PRESSURE: ICD-10-PCS | Performed by: INTERNAL MEDICINE

## 2021-03-12 RX ADMIN — INSULIN LISPRO SCH UNITS: 100 INJECTION, SOLUTION INTRAVENOUS; SUBCUTANEOUS at 11:03

## 2021-03-12 RX ADMIN — GABAPENTIN SCH MG: 400 CAPSULE ORAL at 05:18

## 2021-03-12 RX ADMIN — ENOXAPARIN SODIUM SCH MG: 30 INJECTION SUBCUTANEOUS at 05:18

## 2021-03-12 RX ADMIN — GABAPENTIN SCH MG: 400 CAPSULE ORAL at 13:16

## 2021-03-12 RX ADMIN — Medication PRN MG: at 00:05

## 2021-03-12 RX ADMIN — AZITHROMYCIN FOR INJECTION INJECTION, POWDER, LYOPHILIZED, FOR SOLUTION SCH MLS/HR: 500 INJECTION INTRAVENOUS at 05:20

## 2021-03-12 RX ADMIN — INSULIN LISPRO SCH UNITS: 100 INJECTION, SOLUTION INTRAVENOUS; SUBCUTANEOUS at 07:36

## 2021-03-12 RX ADMIN — OXYCODONE HYDROCHLORIDE AND ACETAMINOPHEN SCH TAB: 10; 325 TABLET ORAL at 08:59

## 2021-03-12 RX ADMIN — METHYLPREDNISOLONE SODIUM SUCCINATE SCH MG: 40 INJECTION, POWDER, FOR SOLUTION INTRAMUSCULAR; INTRAVENOUS at 08:58

## 2021-03-12 RX ADMIN — CEFTRIAXONE SCH MLS/HR: 1 INJECTION, SOLUTION INTRAVENOUS at 01:54

## 2022-03-29 ENCOUNTER — OFFICE VISIT (OUTPATIENT)
Dept: URGENT CARE | Facility: PHYSICIAN GROUP | Age: 62
End: 2022-03-29
Payer: COMMERCIAL

## 2022-03-29 VITALS
HEART RATE: 87 BPM | DIASTOLIC BLOOD PRESSURE: 72 MMHG | TEMPERATURE: 98.6 F | BODY MASS INDEX: 41.09 KG/M2 | WEIGHT: 287 LBS | OXYGEN SATURATION: 90 % | HEIGHT: 70 IN | RESPIRATION RATE: 20 BRPM | SYSTOLIC BLOOD PRESSURE: 148 MMHG

## 2022-03-29 DIAGNOSIS — I87.2 VENOUS STASIS DERMATITIS OF LEFT LOWER EXTREMITY: ICD-10-CM

## 2022-03-29 PROCEDURE — 99213 OFFICE O/P EST LOW 20 MIN: CPT | Performed by: FAMILY MEDICINE

## 2022-03-29 RX ORDER — ZOLPIDEM TARTRATE 5 MG/1
5 TABLET ORAL
COMMUNITY
Start: 2022-03-09

## 2022-03-29 RX ORDER — TRIAMCINOLONE ACETONIDE 1 MG/G
1 CREAM TOPICAL 2 TIMES DAILY
Qty: 30 G | Refills: 1 | Status: SHIPPED | OUTPATIENT
Start: 2022-03-29 | End: 2022-04-12

## 2022-03-29 ASSESSMENT — ENCOUNTER SYMPTOMS
COUGH: 0
SHORTNESS OF BREATH: 0
DIZZINESS: 0
FEVER: 0
CHILLS: 0
LEG SWELLING: 1
NAUSEA: 0
MYALGIAS: 0
VOMITING: 0
SORE THROAT: 0

## 2022-03-30 NOTE — PROGRESS NOTES
Subjective:   Nilesh Huynh is a 61 y.o. male who presents for Leg Swelling (Feet and leg swelling,left,x4 days)        Leg Swelling  Chronicity: Reports left leg swelling over the past 4 days after applying tight neoprene wraps to left lower extremity. The current episode started in the past 7 days. The problem occurs constantly. The problem has been unchanged. Pertinent negatives include no chest pain, chills, coughing, fever, myalgias, nausea, rash, sore throat or vomiting. Associated symptoms comments: Denies fever. Exacerbated by: Direct pressure. Treatments tried: Previous compression wraps. The treatment provided mild relief.     PMH:  has a past medical history of Back pain, Diabetes, Prostate disorder, and Sleep apnea.  MEDS:   Current Outpatient Medications:   •  zolpidem (AMBIEN) 5 MG Tab, Take 5 mg by mouth., Disp: , Rfl:   •  triamcinolone acetonide (KENALOG) 0.1 % Cream, Apply 1 Application topically 2 times a day for 14 days., Disp: 30 g, Rfl: 1  •  albuterol 108 (90 Base) MCG/ACT Aero Soln inhalation aerosol, Inhale 2 Puffs by mouth every four hours as needed for Shortness of Breath (bronchospasm)., Disp: 1 Inhaler, Rfl: 0  •  morphine (MS IR) 30 MG tablet, Take 30 mg by mouth every four hours as needed for Severe Pain., Disp: , Rfl:   •  ibuprofen (MOTRIN) 800 MG Tab, Take 800 mg by mouth., Disp: , Rfl:   •  VENTOLIN  (90 Base) MCG/ACT Aero Soln inhalation aerosol, , Disp: , Rfl:   •  alfuzosin (UROXATRAL) 10 MG SR tablet, Take 1 Tab by mouth every evening., Disp: , Rfl:   •  oxycodone CR (OXYCONTIN) 10 MG TB12, Take 1 Tab by mouth 3 times a day., Disp: 60 Tab, Rfl:   •  Oxycodone-Acetaminophen (PERCOCET-10)  MG TABS, Take 1-2 Tabs by mouth every four hours as needed for Moderate Pain., Disp: 15 Tab, Rfl:   •  senna-docusate (PERICOLACE OR SENOKOT S) 8.6-50 MG TABS, Take 2 Tabs by mouth every day., Disp: 30 Tab, Rfl:   •  gabapentin (NEURONTIN) 400 MG CAPS, Take 400 mg by mouth 6  Times a Day. Taking 7 tablets per day., Disp: , Rfl:   •  azithromycin (ZITHROMAX) 250 MG Tab, Take 2 tabs by mouth once today, then one tab by mouth once daily days 2-5.  Complete all medication. (Patient not taking: Reported on 3/29/2022), Disp: 6 Tab, Rfl: 0  •  methylPREDNISolone (MEDROL DOSEPAK) 4 MG Tablet Therapy Pack, Take as directed (Patient not taking: Reported on 3/29/2022), Disp: 1 Kit, Rfl: 0  •  gabapentin (NEURONTIN) 400 MG Cap, Take 400 mg by mouth., Disp: , Rfl:   •  alfuzosin (UROXATRAL) 10 MG SR tablet, Take 10 mg by mouth. (Patient not taking: Reported on 3/29/2022), Disp: , Rfl:   •  clonazepam (KLONOPIN) 1 MG TABS, Take 1 Tab by mouth 3 times a day. (Patient not taking: No sig reported), Disp: 60 Tab, Rfl:   •  methocarbamol (ROBAXIN) 750 MG TABS, Take 1 Tab by mouth 3 times a day. (Patient not taking: No sig reported), Disp: 120 Tab, Rfl:   ALLERGIES: No Known Allergies  SURGHX:   Past Surgical History:   Procedure Laterality Date   • LAMINOTOMY  6/3/2013    Performed by Jamar Sabillon M.D. at SURGERY [facility] ORS   • LUMBAR LAMINECTOMY DISKECTOMY      2001     SOCHX:  reports that he has never smoked. He has never used smokeless tobacco. He reports current alcohol use of about 0.6 - 1.2 oz of alcohol per week. He reports that he does not use drugs.  FH:   Family History   Problem Relation Age of Onset   • No Known Problems Mother    • No Known Problems Father    • Sleep Apnea Neg Hx      Review of Systems   Constitutional: Negative for chills and fever.   HENT: Negative for sore throat.    Respiratory: Negative for cough and shortness of breath.    Cardiovascular: Negative for chest pain.   Gastrointestinal: Negative for nausea and vomiting.   Musculoskeletal: Negative for myalgias.   Skin: Negative for rash.   Neurological: Negative for dizziness.        Objective:   /72 (BP Location: Right arm, Patient Position: Sitting, BP Cuff Size: Large adult)   Pulse 87   Temp 37 °C (98.6  "°F) (Temporal)   Resp 20   Ht 1.778 m (5' 10\")   Wt (!) 130 kg (287 lb)   SpO2 90%   BMI 41.18 kg/m²   Physical Exam  Vitals and nursing note reviewed.   Constitutional:       General: He is not in acute distress.     Appearance: He is well-developed.   HENT:      Head: Normocephalic and atraumatic.      Right Ear: External ear normal.      Left Ear: External ear normal.      Nose: Nose normal.      Mouth/Throat:      Mouth: Mucous membranes are moist.   Eyes:      Conjunctiva/sclera: Conjunctivae normal.   Cardiovascular:      Rate and Rhythm: Normal rate.      Comments: Left leg diffuse erythema, minimal flaking skin consistent with venous stasis dermatitis    No induration, nontender to palpation  Pulmonary:      Effort: Pulmonary effort is normal. No respiratory distress.      Breath sounds: Normal breath sounds.      Comments: Speaking full sentences  Abdominal:      General: There is no distension.   Musculoskeletal:         General: Normal range of motion.      Right lower leg: Edema present.      Left lower leg: Edema present.   Skin:     General: Skin is warm and dry.   Neurological:      General: No focal deficit present.      Mental Status: He is alert and oriented to person, place, and time. Mental status is at baseline.      Gait: Gait (gait at baseline) normal.   Psychiatric:         Judgment: Judgment normal.           Assessment/Plan:   1. Venous stasis dermatitis of left lower extremity  - triamcinolone acetonide (KENALOG) 0.1 % Cream; Apply 1 Application topically 2 times a day for 14 days.  Dispense: 30 g; Refill: 1    Other orders  - zolpidem (AMBIEN) 5 MG Tab; Take 5 mg by mouth.        Medical Decision Making/Course:  In the course of preparing for this visit with review of the pertinent past medical history, recent and past clinic visits, current medications, and performing chart, immunization, medical history and medication reconciliation, and in the further course of obtaining the " current history pertinent to the clinic visit today, performing an exam and evaluation, ordering and independently evaluating labs, tests  , and/or procedures, prescribing any recommended new medications as noted above, providing any pertinent counseling and education and recommending further coordination of care: Recommendations for symptomatic and supportive measures, at least  15 minutes of total time were spent during this encounter.      Discussed close monitoring, return precautions, and supportive measures of maintaining adequate fluid hydration and caloric intake, relative rest and symptom management as needed for pain and/or fever.    Differential diagnosis, natural history, supportive care, and indications for immediate follow-up discussed.     Advised the patient to follow-up with the primary care physician for recheck, reevaluation, and consideration of further management.    Please note that this dictation was created using voice recognition software. I have worked with consultants from the vendor as well as technical experts from [employer] [facility] [employer] to optimize the interface. I have made every reasonable attempt to correct obvious errors, but I expect that there are errors of grammar and possibly content that I did not discover before finalizing the note.

## 2022-03-30 NOTE — PATIENT INSTRUCTIONS
Chronic Venous Insufficiency  Chronic venous insufficiency is a condition where the leg veins cannot effectively pump blood from the legs to the heart. This happens when the vein walls are either stretched, weakened, or damaged, or when the valves inside the vein are damaged. With the right treatment, you should be able to continue with an active life. This condition is also called venous stasis.  What are the causes?  Common causes of this condition include:  · High blood pressure inside the veins (venous hypertension).  · Sitting or standing too long, causing increased blood pressure in the leg veins.  · A blood clot that blocks blood flow in a vein (deep vein thrombosis, DVT).  · Inflammation of a vein (phlebitis) that causes a blood clot to form.  · Tumors in the pelvis that cause blood to back up.  What increases the risk?  The following factors may make you more likely to develop this condition:  · Having a family history of this condition.  · Obesity.  · Pregnancy.  · Living without enough regular physical activity or exercise (sedentary lifestyle).  · Smoking.  · Having a job that requires long periods of standing or sitting in one place.  · Being a certain age. Women in their 40s and 50s and men in their 70s are more likely to develop this condition.  What are the signs or symptoms?  Symptoms of this condition include:  · Veins that are enlarged, bulging, or twisted (varicose veins).  · Skin breakdown or ulcers.  · Reddened skin or dark discoloration of skin on the leg between the knee and ankle.  · Brown, smooth, tight, and painful skin just above the ankle, usually on the inside of the leg (lipodermatosclerosis).  · Swelling of the legs.  How is this diagnosed?  This condition may be diagnosed based on:  · Your medical history.  · A physical exam.  · Tests, such as:  ? A procedure that creates an image of a blood vessel and nearby organs and provides information about blood flow through the blood vessel  (duplex ultrasound).  ? A procedure that tests blood flow (plethysmography).  ? A procedure that looks at the veins using X-ray and dye (venogram).  How is this treated?  The goals of treatment are to help you return to an active life and to minimize pain or disability. Treatment depends on the severity of your condition, and it may include:  · Wearing compression stockings. These can help relieve symptoms and help prevent your condition from getting worse. However, they do not cure the condition.  · Sclerotherapy. This procedure involves an injection of a solution that shrinks damaged veins.  · Surgery. This may involve:  ? Removing a diseased vein (vein stripping).  ? Cutting off blood flow through the vein (laser ablation surgery).  ? Repairing or reconstructing a valve within the affected vein.  Follow these instructions at home:         · Wear compression stockings as told by your health care provider. These stockings help to prevent blood clots and reduce swelling in your legs.  · Take over-the-counter and prescription medicines only as told by your health care provider.  · Stay active by exercising, walking, or doing different activities. Ask your health care provider what activities are safe for you and how much exercise you need.  · Drink enough fluid to keep your urine pale yellow.  · Do not use any products that contain nicotine or tobacco, such as cigarettes, e-cigarettes, and chewing tobacco. If you need help quitting, ask your health care provider.  · Keep all follow-up visits as told by your health care provider. This is important.  Contact a health care provider if you:  · Have redness, swelling, or more pain in the affected area.  · See a red streak or line that goes up or down from the affected area.  · Have skin breakdown or skin loss in the affected area, even if the breakdown is small.  · Get an injury in the affected area.  Get help right away if:  · You get an injury and an open wound in the  affected area.  · You have:  ? Severe pain that does not get better with medicine.  ? Sudden numbness or weakness in the foot or ankle below the affected area.  ? Trouble moving your foot or ankle.  ? A fever.  ? Worse or persistent symptoms.  ? Chest pain.  ? Shortness of breath.  Summary  · Chronic venous insufficiency is a condition where the leg veins cannot effectively pump blood from the legs to the heart.  · Chronic venous insufficiency occurs when the vein walls become stretched, weakened, or damaged, or when valves within the vein are damaged.  · Treatment depends on how severe your condition is. It often involves wearing compression stockings and may involve having a procedure.  · Make sure you stay active by exercising, walking, or doing different activities. Ask your health care provider what activities are safe for you and how much exercise you need.  This information is not intended to replace advice given to you by your health care provider. Make sure you discuss any questions you have with your health care provider.  Document Released: 04/22/2008 Document Revised: 09/10/2019 Document Reviewed: 09/10/2019  Elsevier Patient Education © 2020 [employer]

## 2022-04-05 ENCOUNTER — TELEPHONE (OUTPATIENT)
Dept: URGENT CARE | Facility: PHYSICIAN GROUP | Age: 62
End: 2022-04-05
Payer: COMMERCIAL

## 2022-04-06 NOTE — TELEPHONE ENCOUNTER
"1. Caller Name: Nilesh Huynh                        Call Back Number: [phone] (home)     Pt came in stating that the tube of cream you prescribed was very tiny for his size.  He states the pharmacy says there is a \"tub size\" available.  Would you be able to send over a bigger size or more refills?    "